# Patient Record
Sex: MALE | Race: BLACK OR AFRICAN AMERICAN | NOT HISPANIC OR LATINO | URBAN - METROPOLITAN AREA
[De-identification: names, ages, dates, MRNs, and addresses within clinical notes are randomized per-mention and may not be internally consistent; named-entity substitution may affect disease eponyms.]

---

## 2023-11-07 ENCOUNTER — EMERGENCY (EMERGENCY)
Facility: HOSPITAL | Age: 53
LOS: 0 days | Discharge: ANOTHER HOSPITAL | End: 2023-11-07
Attending: STUDENT IN AN ORGANIZED HEALTH CARE EDUCATION/TRAINING PROGRAM
Payer: COMMERCIAL

## 2023-11-07 ENCOUNTER — INPATIENT (INPATIENT)
Facility: HOSPITAL | Age: 53
LOS: 3 days | Discharge: HOME | DRG: 176 | End: 2023-11-11
Attending: STUDENT IN AN ORGANIZED HEALTH CARE EDUCATION/TRAINING PROGRAM | Admitting: STUDENT IN AN ORGANIZED HEALTH CARE EDUCATION/TRAINING PROGRAM
Payer: COMMERCIAL

## 2023-11-07 VITALS
WEIGHT: 205.03 LBS | SYSTOLIC BLOOD PRESSURE: 132 MMHG | TEMPERATURE: 98 F | HEIGHT: 72 IN | DIASTOLIC BLOOD PRESSURE: 89 MMHG | HEART RATE: 137 BPM | RESPIRATION RATE: 18 BRPM | OXYGEN SATURATION: 94 %

## 2023-11-07 VITALS
TEMPERATURE: 99 F | HEART RATE: 132 BPM | SYSTOLIC BLOOD PRESSURE: 156 MMHG | RESPIRATION RATE: 20 BRPM | OXYGEN SATURATION: 95 % | DIASTOLIC BLOOD PRESSURE: 96 MMHG | HEIGHT: 72 IN | WEIGHT: 205.03 LBS

## 2023-11-07 VITALS — HEART RATE: 129 BPM | SYSTOLIC BLOOD PRESSURE: 170 MMHG | DIASTOLIC BLOOD PRESSURE: 121 MMHG

## 2023-11-07 DIAGNOSIS — S00.511A ABRASION OF LIP, INITIAL ENCOUNTER: ICD-10-CM
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
## 2023-11-07 DIAGNOSIS — I26.99 OTHER PULMONARY EMBOLISM WITHOUT ACUTE COR PULMONALE: ICD-10-CM

## 2023-11-07 DIAGNOSIS — R00.0 TACHYCARDIA, UNSPECIFIED: ICD-10-CM

## 2023-11-07 DIAGNOSIS — I10 ESSENTIAL (PRIMARY) HYPERTENSION: ICD-10-CM

## 2023-11-07 DIAGNOSIS — E78.5 HYPERLIPIDEMIA, UNSPECIFIED: ICD-10-CM

## 2023-11-07 DIAGNOSIS — R55 SYNCOPE AND COLLAPSE: ICD-10-CM

## 2023-11-07 DIAGNOSIS — I26.09 OTHER PULMONARY EMBOLISM WITH ACUTE COR PULMONALE: ICD-10-CM
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
## 2023-11-07 LAB
ALBUMIN SERPL ELPH-MCNC: 3.6 G/DL — SIGNIFICANT CHANGE UP (ref 3.3–5)
ALBUMIN SERPL ELPH-MCNC: 3.6 G/DL — SIGNIFICANT CHANGE UP (ref 3.3–5)
ALBUMIN SERPL ELPH-MCNC: 4.3 G/DL — SIGNIFICANT CHANGE UP (ref 3.3–5)
ALBUMIN SERPL ELPH-MCNC: 4.3 G/DL — SIGNIFICANT CHANGE UP (ref 3.3–5)
ALP SERPL-CCNC: 130 U/L — HIGH (ref 40–120)
ALP SERPL-CCNC: 130 U/L — HIGH (ref 40–120)
ALP SERPL-CCNC: 137 U/L — HIGH (ref 40–120)
ALP SERPL-CCNC: 137 U/L — HIGH (ref 40–120)
ALT FLD-CCNC: 115 U/L — HIGH (ref 10–45)
ALT FLD-CCNC: 115 U/L — HIGH (ref 10–45)
ALT FLD-CCNC: 142 U/L — HIGH (ref 12–78)
ALT FLD-CCNC: 142 U/L — HIGH (ref 12–78)
ANION GAP SERPL CALC-SCNC: 13 MMOL/L — SIGNIFICANT CHANGE UP (ref 5–17)
ANION GAP SERPL CALC-SCNC: 13 MMOL/L — SIGNIFICANT CHANGE UP (ref 5–17)
ANION GAP SERPL CALC-SCNC: 9 MMOL/L — SIGNIFICANT CHANGE UP (ref 5–17)
ANION GAP SERPL CALC-SCNC: 9 MMOL/L — SIGNIFICANT CHANGE UP (ref 5–17)
APTT BLD: 32.1 SEC — SIGNIFICANT CHANGE UP (ref 24.5–35.6)
APTT BLD: 32.1 SEC — SIGNIFICANT CHANGE UP (ref 24.5–35.6)
AST SERPL-CCNC: 157 U/L — HIGH (ref 10–40)
AST SERPL-CCNC: 157 U/L — HIGH (ref 10–40)
AST SERPL-CCNC: 203 U/L — HIGH (ref 15–37)
AST SERPL-CCNC: 203 U/L — HIGH (ref 15–37)
BASOPHILS # BLD AUTO: 0.02 K/UL — SIGNIFICANT CHANGE UP (ref 0–0.2)
BASOPHILS # BLD AUTO: 0.02 K/UL — SIGNIFICANT CHANGE UP (ref 0–0.2)
BASOPHILS # BLD AUTO: 0.06 K/UL — SIGNIFICANT CHANGE UP (ref 0–0.2)
BASOPHILS # BLD AUTO: 0.06 K/UL — SIGNIFICANT CHANGE UP (ref 0–0.2)
BASOPHILS NFR BLD AUTO: 0.3 % — SIGNIFICANT CHANGE UP (ref 0–2)
BASOPHILS NFR BLD AUTO: 0.3 % — SIGNIFICANT CHANGE UP (ref 0–2)
BASOPHILS NFR BLD AUTO: 0.9 % — SIGNIFICANT CHANGE UP (ref 0–2)
BASOPHILS NFR BLD AUTO: 0.9 % — SIGNIFICANT CHANGE UP (ref 0–2)
BILIRUB SERPL-MCNC: 0.4 MG/DL — SIGNIFICANT CHANGE UP (ref 0.2–1.2)
BUN SERPL-MCNC: 14 MG/DL — SIGNIFICANT CHANGE UP (ref 7–23)
BUN SERPL-MCNC: 14 MG/DL — SIGNIFICANT CHANGE UP (ref 7–23)
BUN SERPL-MCNC: 15 MG/DL — SIGNIFICANT CHANGE UP (ref 7–23)
BUN SERPL-MCNC: 15 MG/DL — SIGNIFICANT CHANGE UP (ref 7–23)
CALCIUM SERPL-MCNC: 9.1 MG/DL — SIGNIFICANT CHANGE UP (ref 8.5–10.1)
CALCIUM SERPL-MCNC: 9.1 MG/DL — SIGNIFICANT CHANGE UP (ref 8.5–10.1)
CALCIUM SERPL-MCNC: 9.7 MG/DL — SIGNIFICANT CHANGE UP (ref 8.4–10.5)
CALCIUM SERPL-MCNC: 9.7 MG/DL — SIGNIFICANT CHANGE UP (ref 8.4–10.5)
CHLORIDE SERPL-SCNC: 104 MMOL/L — SIGNIFICANT CHANGE UP (ref 96–108)
CO2 SERPL-SCNC: 22 MMOL/L — SIGNIFICANT CHANGE UP (ref 22–31)
CO2 SERPL-SCNC: 22 MMOL/L — SIGNIFICANT CHANGE UP (ref 22–31)
CO2 SERPL-SCNC: 25 MMOL/L — SIGNIFICANT CHANGE UP (ref 22–31)
CO2 SERPL-SCNC: 25 MMOL/L — SIGNIFICANT CHANGE UP (ref 22–31)
CREAT SERPL-MCNC: 1.12 MG/DL — SIGNIFICANT CHANGE UP (ref 0.5–1.3)
CREAT SERPL-MCNC: 1.12 MG/DL — SIGNIFICANT CHANGE UP (ref 0.5–1.3)
CREAT SERPL-MCNC: 1.38 MG/DL — HIGH (ref 0.5–1.3)
CREAT SERPL-MCNC: 1.38 MG/DL — HIGH (ref 0.5–1.3)
D DIMER BLD IA.RAPID-MCNC: 6121 NG/ML DDU — HIGH
D DIMER BLD IA.RAPID-MCNC: 6121 NG/ML DDU — HIGH
EGFR: 61 ML/MIN/1.73M2 — SIGNIFICANT CHANGE UP
EGFR: 61 ML/MIN/1.73M2 — SIGNIFICANT CHANGE UP
EGFR: 78 ML/MIN/1.73M2 — SIGNIFICANT CHANGE UP
EGFR: 78 ML/MIN/1.73M2 — SIGNIFICANT CHANGE UP
EOSINOPHIL # BLD AUTO: 0 K/UL — SIGNIFICANT CHANGE UP (ref 0–0.5)
EOSINOPHIL # BLD AUTO: 0 K/UL — SIGNIFICANT CHANGE UP (ref 0–0.5)
EOSINOPHIL # BLD AUTO: 0.08 K/UL — SIGNIFICANT CHANGE UP (ref 0–0.5)
EOSINOPHIL # BLD AUTO: 0.08 K/UL — SIGNIFICANT CHANGE UP (ref 0–0.5)
EOSINOPHIL NFR BLD AUTO: 0 % — SIGNIFICANT CHANGE UP (ref 0–6)
EOSINOPHIL NFR BLD AUTO: 0 % — SIGNIFICANT CHANGE UP (ref 0–6)
EOSINOPHIL NFR BLD AUTO: 1.3 % — SIGNIFICANT CHANGE UP (ref 0–6)
EOSINOPHIL NFR BLD AUTO: 1.3 % — SIGNIFICANT CHANGE UP (ref 0–6)
GLUCOSE SERPL-MCNC: 150 MG/DL — HIGH (ref 70–99)
GLUCOSE SERPL-MCNC: 150 MG/DL — HIGH (ref 70–99)
GLUCOSE SERPL-MCNC: 195 MG/DL — HIGH (ref 70–99)
GLUCOSE SERPL-MCNC: 195 MG/DL — HIGH (ref 70–99)
HCT VFR BLD CALC: 37.5 % — LOW (ref 39–50)
HCT VFR BLD CALC: 37.5 % — LOW (ref 39–50)
HCT VFR BLD CALC: 38.7 % — LOW (ref 39–50)
HCT VFR BLD CALC: 38.7 % — LOW (ref 39–50)
HGB BLD-MCNC: 12.2 G/DL — LOW (ref 13–17)
HGB BLD-MCNC: 12.2 G/DL — LOW (ref 13–17)
HGB BLD-MCNC: 12.4 G/DL — LOW (ref 13–17)
HGB BLD-MCNC: 12.4 G/DL — LOW (ref 13–17)
IMM GRANULOCYTES NFR BLD AUTO: 0.3 % — SIGNIFICANT CHANGE UP (ref 0–0.9)
IMM GRANULOCYTES NFR BLD AUTO: 0.3 % — SIGNIFICANT CHANGE UP (ref 0–0.9)
INR BLD: 0.98 RATIO — SIGNIFICANT CHANGE UP (ref 0.85–1.18)
INR BLD: 0.98 RATIO — SIGNIFICANT CHANGE UP (ref 0.85–1.18)
LYMPHOCYTES # BLD AUTO: 0.6 K/UL — LOW (ref 1–3.3)
LYMPHOCYTES # BLD AUTO: 0.6 K/UL — LOW (ref 1–3.3)
LYMPHOCYTES # BLD AUTO: 1.15 K/UL — SIGNIFICANT CHANGE UP (ref 1–3.3)
LYMPHOCYTES # BLD AUTO: 1.15 K/UL — SIGNIFICANT CHANGE UP (ref 1–3.3)
LYMPHOCYTES # BLD AUTO: 18.3 % — SIGNIFICANT CHANGE UP (ref 13–44)
LYMPHOCYTES # BLD AUTO: 18.3 % — SIGNIFICANT CHANGE UP (ref 13–44)
LYMPHOCYTES # BLD AUTO: 9.6 % — LOW (ref 13–44)
LYMPHOCYTES # BLD AUTO: 9.6 % — LOW (ref 13–44)
MAGNESIUM SERPL-MCNC: 1.9 MG/DL — SIGNIFICANT CHANGE UP (ref 1.6–2.6)
MAGNESIUM SERPL-MCNC: 1.9 MG/DL — SIGNIFICANT CHANGE UP (ref 1.6–2.6)
MANUAL SMEAR VERIFICATION: SIGNIFICANT CHANGE UP
MANUAL SMEAR VERIFICATION: SIGNIFICANT CHANGE UP
MCHC RBC-ENTMCNC: 28.2 PG — SIGNIFICANT CHANGE UP (ref 27–34)
MCHC RBC-ENTMCNC: 28.2 PG — SIGNIFICANT CHANGE UP (ref 27–34)
MCHC RBC-ENTMCNC: 28.7 PG — SIGNIFICANT CHANGE UP (ref 27–34)
MCHC RBC-ENTMCNC: 28.7 PG — SIGNIFICANT CHANGE UP (ref 27–34)
MCHC RBC-ENTMCNC: 32 G/DL — SIGNIFICANT CHANGE UP (ref 32–36)
MCHC RBC-ENTMCNC: 32 G/DL — SIGNIFICANT CHANGE UP (ref 32–36)
MCHC RBC-ENTMCNC: 32.5 GM/DL — SIGNIFICANT CHANGE UP (ref 32–36)
MCHC RBC-ENTMCNC: 32.5 GM/DL — SIGNIFICANT CHANGE UP (ref 32–36)
MCV RBC AUTO: 88.2 FL — SIGNIFICANT CHANGE UP (ref 80–100)
MONOCYTES # BLD AUTO: 0.62 K/UL — SIGNIFICANT CHANGE UP (ref 0–0.9)
MONOCYTES # BLD AUTO: 0.62 K/UL — SIGNIFICANT CHANGE UP (ref 0–0.9)
MONOCYTES # BLD AUTO: 0.71 K/UL — SIGNIFICANT CHANGE UP (ref 0–0.9)
MONOCYTES # BLD AUTO: 0.71 K/UL — SIGNIFICANT CHANGE UP (ref 0–0.9)
MONOCYTES NFR BLD AUTO: 11.3 % — SIGNIFICANT CHANGE UP (ref 2–14)
MONOCYTES NFR BLD AUTO: 11.3 % — SIGNIFICANT CHANGE UP (ref 2–14)
MONOCYTES NFR BLD AUTO: 9.8 % — SIGNIFICANT CHANGE UP (ref 2–14)
MONOCYTES NFR BLD AUTO: 9.8 % — SIGNIFICANT CHANGE UP (ref 2–14)
NEUTROPHILS # BLD AUTO: 4.41 K/UL — SIGNIFICANT CHANGE UP (ref 1.8–7.4)
NEUTROPHILS # BLD AUTO: 4.41 K/UL — SIGNIFICANT CHANGE UP (ref 1.8–7.4)
NEUTROPHILS # BLD AUTO: 4.9 K/UL — SIGNIFICANT CHANGE UP (ref 1.8–7.4)
NEUTROPHILS # BLD AUTO: 4.9 K/UL — SIGNIFICANT CHANGE UP (ref 1.8–7.4)
NEUTROPHILS NFR BLD AUTO: 70 % — SIGNIFICANT CHANGE UP (ref 43–77)
NEUTROPHILS NFR BLD AUTO: 70 % — SIGNIFICANT CHANGE UP (ref 43–77)
NEUTROPHILS NFR BLD AUTO: 78.2 % — HIGH (ref 43–77)
NEUTROPHILS NFR BLD AUTO: 78.2 % — HIGH (ref 43–77)
NRBC # BLD: 0 /100 WBCS — SIGNIFICANT CHANGE UP (ref 0–0)
NRBC # BLD: 0 /100 WBCS — SIGNIFICANT CHANGE UP (ref 0–0)
NT-PROBNP SERPL-SCNC: 2217 PG/ML — HIGH (ref 0–125)
NT-PROBNP SERPL-SCNC: 2217 PG/ML — HIGH (ref 0–125)
NT-PROBNP SERPL-SCNC: 2348 PG/ML — HIGH (ref 0–300)
NT-PROBNP SERPL-SCNC: 2348 PG/ML — HIGH (ref 0–300)
OVALOCYTES BLD QL SMEAR: SLIGHT — SIGNIFICANT CHANGE UP
OVALOCYTES BLD QL SMEAR: SLIGHT — SIGNIFICANT CHANGE UP
PLAT MORPH BLD: NORMAL — SIGNIFICANT CHANGE UP
PLAT MORPH BLD: NORMAL — SIGNIFICANT CHANGE UP
PLATELET # BLD AUTO: 93 K/UL — LOW (ref 150–400)
PLATELET # BLD AUTO: 93 K/UL — LOW (ref 150–400)
PLATELET # BLD AUTO: 97 K/UL — LOW (ref 150–400)
PLATELET # BLD AUTO: 97 K/UL — LOW (ref 150–400)
POIKILOCYTOSIS BLD QL AUTO: SLIGHT — SIGNIFICANT CHANGE UP
POIKILOCYTOSIS BLD QL AUTO: SLIGHT — SIGNIFICANT CHANGE UP
POTASSIUM SERPL-MCNC: 3.2 MMOL/L — LOW (ref 3.5–5.3)
POTASSIUM SERPL-MCNC: 3.2 MMOL/L — LOW (ref 3.5–5.3)
POTASSIUM SERPL-MCNC: 3.4 MMOL/L — LOW (ref 3.5–5.3)
POTASSIUM SERPL-MCNC: 3.4 MMOL/L — LOW (ref 3.5–5.3)
POTASSIUM SERPL-SCNC: 3.2 MMOL/L — LOW (ref 3.5–5.3)
POTASSIUM SERPL-SCNC: 3.2 MMOL/L — LOW (ref 3.5–5.3)
POTASSIUM SERPL-SCNC: 3.4 MMOL/L — LOW (ref 3.5–5.3)
POTASSIUM SERPL-SCNC: 3.4 MMOL/L — LOW (ref 3.5–5.3)
PROT SERPL-MCNC: 7.5 G/DL — SIGNIFICANT CHANGE UP (ref 6–8.3)
PROT SERPL-MCNC: 7.5 G/DL — SIGNIFICANT CHANGE UP (ref 6–8.3)
PROT SERPL-MCNC: 7.8 GM/DL — SIGNIFICANT CHANGE UP (ref 6–8.3)
PROT SERPL-MCNC: 7.8 GM/DL — SIGNIFICANT CHANGE UP (ref 6–8.3)
PROTHROM AB SERPL-ACNC: 11.8 SEC — SIGNIFICANT CHANGE UP (ref 9.5–13)
PROTHROM AB SERPL-ACNC: 11.8 SEC — SIGNIFICANT CHANGE UP (ref 9.5–13)
RBC # BLD: 4.25 M/UL — SIGNIFICANT CHANGE UP (ref 4.2–5.8)
RBC # BLD: 4.25 M/UL — SIGNIFICANT CHANGE UP (ref 4.2–5.8)
RBC # BLD: 4.39 M/UL — SIGNIFICANT CHANGE UP (ref 4.2–5.8)
RBC # BLD: 4.39 M/UL — SIGNIFICANT CHANGE UP (ref 4.2–5.8)
RBC # FLD: 13.2 % — SIGNIFICANT CHANGE UP (ref 10.3–14.5)
RBC # FLD: 13.2 % — SIGNIFICANT CHANGE UP (ref 10.3–14.5)
RBC # FLD: 13.3 % — SIGNIFICANT CHANGE UP (ref 10.3–14.5)
RBC # FLD: 13.3 % — SIGNIFICANT CHANGE UP (ref 10.3–14.5)
RBC BLD AUTO: ABNORMAL
RBC BLD AUTO: ABNORMAL
SODIUM SERPL-SCNC: 138 MMOL/L — SIGNIFICANT CHANGE UP (ref 135–145)
SODIUM SERPL-SCNC: 138 MMOL/L — SIGNIFICANT CHANGE UP (ref 135–145)
SODIUM SERPL-SCNC: 139 MMOL/L — SIGNIFICANT CHANGE UP (ref 135–145)
SODIUM SERPL-SCNC: 139 MMOL/L — SIGNIFICANT CHANGE UP (ref 135–145)
TROPONIN I, HIGH SENSITIVITY RESULT: 946 NG/L — HIGH
TROPONIN I, HIGH SENSITIVITY RESULT: 946 NG/L — HIGH
TROPONIN T, HIGH SENSITIVITY RESULT: 180 NG/L — HIGH (ref 0–51)
TROPONIN T, HIGH SENSITIVITY RESULT: 180 NG/L — HIGH (ref 0–51)
WBC # BLD: 6.27 K/UL — SIGNIFICANT CHANGE UP (ref 3.8–10.5)
WBC # BLD: 6.27 K/UL — SIGNIFICANT CHANGE UP (ref 3.8–10.5)
WBC # BLD: 6.3 K/UL — SIGNIFICANT CHANGE UP (ref 3.8–10.5)
WBC # BLD: 6.3 K/UL — SIGNIFICANT CHANGE UP (ref 3.8–10.5)
WBC # FLD AUTO: 6.27 K/UL — SIGNIFICANT CHANGE UP (ref 3.8–10.5)
WBC # FLD AUTO: 6.27 K/UL — SIGNIFICANT CHANGE UP (ref 3.8–10.5)
WBC # FLD AUTO: 6.3 K/UL — SIGNIFICANT CHANGE UP (ref 3.8–10.5)
WBC # FLD AUTO: 6.3 K/UL — SIGNIFICANT CHANGE UP (ref 3.8–10.5)

## 2023-11-07 PROCEDURE — 70450 CT HEAD/BRAIN W/O DYE: CPT | Mod: 26,MA

## 2023-11-07 PROCEDURE — 99291 CRITICAL CARE FIRST HOUR: CPT

## 2023-11-07 PROCEDURE — 93356 MYOCRD STRAIN IMG SPCKL TRCK: CPT

## 2023-11-07 PROCEDURE — 93306 TTE W/DOPPLER COMPLETE: CPT | Mod: 26

## 2023-11-07 PROCEDURE — 71045 X-RAY EXAM CHEST 1 VIEW: CPT | Mod: 26

## 2023-11-07 PROCEDURE — 71275 CT ANGIOGRAPHY CHEST: CPT | Mod: 26,MA

## 2023-11-07 PROCEDURE — 70486 CT MAXILLOFACIAL W/O DYE: CPT | Mod: 26,MA

## 2023-11-07 PROCEDURE — 72125 CT NECK SPINE W/O DYE: CPT | Mod: 26,MA

## 2023-11-07 PROCEDURE — 93010 ELECTROCARDIOGRAM REPORT: CPT

## 2023-11-07 RX ORDER — HEPARIN SODIUM 5000 [USP'U]/ML
3500 INJECTION INTRAVENOUS; SUBCUTANEOUS EVERY 6 HOURS
Refills: 0 | Status: DISCONTINUED | OUTPATIENT
Start: 2023-11-07 | End: 2023-11-07

## 2023-11-07 RX ORDER — HEPARIN SODIUM 5000 [USP'U]/ML
INJECTION INTRAVENOUS; SUBCUTANEOUS
Qty: 25000 | Refills: 0 | Status: DISCONTINUED | OUTPATIENT
Start: 2023-11-07 | End: 2023-11-07

## 2023-11-07 RX ORDER — SODIUM CHLORIDE 9 MG/ML
500 INJECTION INTRAMUSCULAR; INTRAVENOUS; SUBCUTANEOUS ONCE
Refills: 0 | Status: COMPLETED | OUTPATIENT
Start: 2023-11-07 | End: 2023-11-07

## 2023-11-07 RX ORDER — RAMIPRIL 5 MG
1 CAPSULE ORAL
Refills: 0 | DISCHARGE

## 2023-11-07 RX ORDER — HEPARIN SODIUM 5000 [USP'U]/ML
INJECTION INTRAVENOUS; SUBCUTANEOUS
Qty: 25000 | Refills: 0 | Status: DISCONTINUED | OUTPATIENT
Start: 2023-11-07 | End: 2023-11-09

## 2023-11-07 RX ORDER — ATORVASTATIN CALCIUM 80 MG/1
1 TABLET, FILM COATED ORAL
Refills: 0 | DISCHARGE

## 2023-11-07 RX ORDER — HEPARIN SODIUM 5000 [USP'U]/ML
7500 INJECTION INTRAVENOUS; SUBCUTANEOUS EVERY 6 HOURS
Refills: 0 | Status: DISCONTINUED | OUTPATIENT
Start: 2023-11-07 | End: 2023-11-07

## 2023-11-07 RX ORDER — SODIUM CHLORIDE 9 MG/ML
1000 INJECTION INTRAMUSCULAR; INTRAVENOUS; SUBCUTANEOUS ONCE
Refills: 0 | Status: COMPLETED | OUTPATIENT
Start: 2023-11-07 | End: 2023-11-07

## 2023-11-07 RX ORDER — INDAPAMIDE 1.25 MG
1 TABLET ORAL
Refills: 0 | DISCHARGE

## 2023-11-07 RX ORDER — HEPARIN SODIUM 5000 [USP'U]/ML
7500 INJECTION INTRAVENOUS; SUBCUTANEOUS ONCE
Refills: 0 | Status: COMPLETED | OUTPATIENT
Start: 2023-11-07 | End: 2023-11-07

## 2023-11-07 RX ADMIN — HEPARIN SODIUM 1700 UNIT(S)/HR: 5000 INJECTION INTRAVENOUS; SUBCUTANEOUS at 18:29

## 2023-11-07 RX ADMIN — SODIUM CHLORIDE 500 MILLILITER(S): 9 INJECTION INTRAMUSCULAR; INTRAVENOUS; SUBCUTANEOUS at 21:55

## 2023-11-07 RX ADMIN — SODIUM CHLORIDE 1000 MILLILITER(S): 9 INJECTION INTRAMUSCULAR; INTRAVENOUS; SUBCUTANEOUS at 17:07

## 2023-11-07 RX ADMIN — HEPARIN SODIUM 1700 UNIT(S)/HR: 5000 INJECTION INTRAVENOUS; SUBCUTANEOUS at 21:56

## 2023-11-07 RX ADMIN — HEPARIN SODIUM 7500 UNIT(S): 5000 INJECTION INTRAVENOUS; SUBCUTANEOUS at 18:15

## 2023-11-07 NOTE — H&P ADULT - PROBLEM SELECTOR PLAN 1
- ECHO, Doppler, CTA, CXR  - CBC, BMP, VBG  - Heparin  - No oxygen requirements at this time - ECHO, Doppler, CTA, CXR  - CBC, BMP, VBG, Coags  - Heparin  - No oxygen requirements at this time  - continuous pulse ox  - Heme/Onc consult for w/u of possible thrombophilia disorder - ECHO, LE Doppler, CTA, CXR  - CBC, BMP, VBG, Coags  - Heparin  - No oxygen requirements at this time; continuous pulse ox  - Cards consulted  - Heme/Onc consult warranted in AM for w/u of possible thrombophilia disorder - ECHO, LE Doppler, CTA, CXR  - CBC, BMP, Coags  - Heparin  - No oxygen requirements at this time; continuous pulse ox  - Cards consulted  - Heme/Onc consult warranted in AM for w/u of possible thrombophilia disorder - ECHO, LE Doppler, CTA, CXR  - CBC, BMP, Coags, Trops, BNP  - Heparin  - No oxygen requirements at this time; continuous pulse ox  - Cards consulted (no surgical intervention at this time)  - Heme/Onc consult warranted in AM for w/u of possible thrombophilia disorder - ECHO, LE Doppler, CTA, CXR  - CBC, BMP, Coags, Trops, BNP  - Heparin  - No oxygen requirements at this time; continuous pulse ox  - Cards consulted (no surgical intervention at this time) - Pt found to have CTA with large bilat PE in pulm art extending into lobar branches; he is not hypoxic or hypotensive on adm.   - ECHO shows R heart strain and severe pulm HTN, LE Doppler ordered, CXR clear lungs  - CBC, BMP, Coags, elevated Trops and BNP as well as transaminitis  - Heparin  - No oxygen requirements at this time; continuous pulse ox  - Cards consulted (no surgical intervention at this time)  - Monitor on tele - Pt found to have CTA with large bilat PE in pulm art extending into lobar branches; he is not hypoxic or hypotensive on adm.   - ECHO shows R heart strain and severe pulm HTN, LE Doppler ordered, CXR clear lungs  - CBC, BMP, Coags obtained, elevated Trops (trend for peak) and BNP as well as transaminitis seen  - Heparin drip started  - No oxygen requirements at this time; continuous pulse ox  - Cards consulted (no surgical intervention at this time)  - Monitor on tele

## 2023-11-07 NOTE — ED PROVIDER NOTE - OBJECTIVE STATEMENT
53-year-old male with past medical history hypertension hyperlipidemia presenting to the ED status post syncopal episode, patient walking in park, and had syncopal insult fell face forward and felt lightheaded while walking, tooth #9 displacement and abrasion to noted to upper and lower lip.  Patient with recent Tdap 6 weeks ago, reports no chest pain or shortness of breath denies any abdominal pain denies any associate nausea vomiting fever chills.  Reports no current complaints at this time.

## 2023-11-07 NOTE — ED PROVIDER NOTE - CRITICAL CARE ATTENDING CONTRIBUTION TO CARE
Upon my evaluation, this patient had a high probability of imminent or life-threatening deterioration due to PE with R heart strain, which required my direct attention, intervention, and personal management.  The patient has a  medical condition that impairs one or more vital organ systems.  Frequent personal assessment and adjustment of medical interventions was performed.      I have personally provided 60 minutes of critical care time exclusive of time spent on separately billable procedures. Time includes review of laboratory data, radiology results, discussion with consultants, patient and family; monitoring for potential decompensation, as well as time spent retrieving data and reviewing the chart and documenting the visit. Interventions were performed as documented above.

## 2023-11-07 NOTE — H&P ADULT - NSHPSOCIALHISTORY_GEN_ALL_CORE
Lives with aunt at home. Has daughter. Works two jobs. Lives with aunt at home. Has daughter. Employed in produce and insurance (2 jobs). [place]

## 2023-11-07 NOTE — ED ADULT TRIAGE NOTE - WEIGHT IN KG
What Type Of Note Output Would You Prefer (Optional)?: Bullet Format How Severe Are Your Spot(S)?: moderate Have Your Spot(S) Been Treated In The Past?: has not been treated Hpi Title: Evaluation of Skin Lesions 93

## 2023-11-07 NOTE — H&P ADULT - PROBLEM SELECTOR PLAN 3
- Pt placed back on home Ramipril 5mg  - Holding Lasix - Pt placed back on home Ramipril 5mg  - Holding thiazide - Pt placed back on home Ramipril 5mg  - Holding thiazide (Inapamide) - Holding Ramipril and thiazide (Inapamide) due to right heart strain

## 2023-11-07 NOTE — ED ADULT NURSE REASSESSMENT NOTE - NS ED NURSE REASSESS COMMENT FT1
Pt transferred on Heparin running at 17u/hr started at 1808. Two RNs at bedside with attending Danyel Kraft for verification. Current infusion to continue at this time until 6hr PTT (0000am). New Heparin infusion will be ordered once resulted.

## 2023-11-07 NOTE — ED PROVIDER NOTE - PROGRESS NOTE DETAILS
Coleman: Signed out to me pending CT report - patient with b/l main lobar PE with right heart strain.  Cardiac markers elevated, platelets 97, discussed with PERT and recommending heparin as benefits outweigh risks at this time and transfer for possible intervention.  Patient has no other signs of obvious external trauma.  CT head without bleed.  Currently remains comfortable on RA 95% in no respiratory distress and denies any pain.  Will transfer to [facility] ED.  Patient and family at bedside updated on plan and results and consented for transfer.

## 2023-11-07 NOTE — H&P ADULT - NSHPREVIEWOFSYSTEMS_GEN_ALL_CORE

## 2023-11-07 NOTE — CONSULT NOTE ADULT - SUBJECTIVE AND OBJECTIVE BOX
Patient seen and evaluated at bedside    Chief Complaint: Syncope    HPI:  52yo M w/HTN, HLD who presents as a transfer from [place] for a PE. Patient was walking to his car from work today when he felt dizzy, he bent down and had a syncopal episode. Regained consciousness quickly, called his aunt who told him to go to the ED.    Presented to [place], BP 160s/100s, HR 130s, SpO2 97% on RA.  CTPE notable for bilateral PE with R heart strain.    PMHx:   HTN (hypertension)    HLD (hyperlipidemia)        PSHx:   No significant past surgical history        Allergies:  No Known Allergies      Home Meds:  Home Medications:      Current Medications:   heparin  Infusion.  Unit(s)/Hr IV Continuous <Continuous>      FAMILY HISTORY:      Social History:  Smoking History:  Alcohol Use:  Drug Use:    REVIEW OF SYSTEMS:  CONSTITUTIONAL: No weakness, fevers or chills  EYES/ENT: No visual changes;  No dysphagia  NECK: No pain or stiffness  RESPIRATORY: No cough, wheezing, hemoptysis; No shortness of breath  CARDIOVASCULAR: No chest pain or palpitations; No lower extremity edema  GASTROINTESTINAL: No abdominal or epigastric pain. No nausea, vomiting, or hematemesis; No diarrhea or constipation. No melena or hematochezia.  BACK: No back pain  GENITOURINARY: No dysuria, frequency or hematuria  NEUROLOGICAL: No numbness or weakness  SKIN: No itching, burning, rashes, or lesions   All other review of systems is negative unless indicated above.    Physical Exam:  T(F): 98.9 (11-07), Max: 99.2 (11-07)  HR: 126 (11-07) (126 - 140)  BP: 134/100 (11-07) (134/100 - 156/96)  RR: 21 (11-07)  SpO2: 96% (11-07)  GENERAL: No acute distress, well-developed  HEAD:  Atraumatic, Normocephalic  ENT: EOMI, PERRLA, conjunctiva and sclera clear, Neck supple, No JVD, moist mucosa  CHEST/LUNG: Clear to auscultation bilaterally; No wheeze, equal breath sounds bilaterally   BACK: No spinal tenderness  HEART: Regular rate and rhythm; No murmurs, rubs, or gallops  ABDOMEN: Soft, Nontender, Nondistended; Bowel sounds present  EXTREMITIES:  No clubbing, cyanosis, or edema  PSYCH: Nl behavior, nl affect  NEUROLOGY: AAOx3, non-focal, cranial nerves intact  SKIN: Normal color, No rashes or lesions  LINES:    Cardiovascular Diagnostic Testing:    ECG: Personally reviewed:    Echo: Personally reviewed:    Stress Testing:    Cath:    Imaging:    CXR: Personally reviewed    Labs: Personally reviewed                        12.2   6.27  )-----------( 93       ( 07 Nov 2023 20:07 )             37.5     11-07    139  |  104  |  14  ----------------------------<  150<H>  3.4<L>   |  22  |  1.12    Ca    9.7      07 Nov 2023 20:07  Mg     1.9     11-07    TPro  7.5  /  Alb  4.3  /  TBili  0.4  /  DBili  x   /  AST  157<H>  /  ALT  115<H>  /  AlkPhos  130<H>  11-07        CARDIAC MARKERS ( 07 Nov 2023 20:07 )  180 ng/L / x     / x     / x     / x     / x                     Patient seen and evaluated at bedside    Chief Complaint: Syncope    HPI:  54yo M w/HTN, HLD who presents as a transfer from [place] for a PE. Patient was walking to his car from work today when he felt dizzy, he bent down and had a syncopal episode. Regained consciousness quickly, called his aunt who told him to go to the ED.    Presented to [place], BP 160s/100s, HR 130s, SpO2 97% on RA.  CTPE notable for bilateral PE with R heart strain. Troponin and BNP elevated as well. Transfer call initiated for consideration of interventional approaches and higher level of care. Transferred to [facility] ED for Cardiology evaluation. Started on herpain gtt prior to transfer.    Upon arrival to [facility], tachycardic to 130 but BP remained elevated. Breathing comfortably on RA, satting 97%. Stat TTE with evidence of RV dysfunction, no clot in transit. Patient without symptoms at all, no longer dizzy or lightheaded. Patient denies personal or family h/o clotting, but has worn compression socks for years because he was told he has "leaky veins." Not up to date on age appropriate cancer screening.    PMHx:   HTN (hypertension)    HLD (hyperlipidemia)        PSHx:   No significant past surgical history        Allergies:  No Known Allergies      Home Meds:  Home Medications:  Ramipril 5mg  Indapamide 1.5mg  Atorvastatin 40mg    Current Medications:   heparin  Infusion.  Unit(s)/Hr IV Continuous <Continuous>      FAMILY HISTORY:  NC    Social History:  Smoking History: no  Alcohol Use: social  Drug Use: no    REVIEW OF SYSTEMS:  All other review of systems is negative unless indicated above.    Physical Exam:  T(F): 98.9 (11-07), Max: 99.2 (11-07)  HR: 126 (11-07) (126 - 140)  BP: 134/100 (11-07) (134/100 - 156/96)  RR: 21 (11-07)  SpO2: 96% (11-07)  GENERAL: No acute distress, well-developed  HEAD:  Atraumatic, Normocephalic  ENT: EOMI, PERRLA, conjunctiva and sclera clear, Neck supple, No JVD, moist mucosa  CHEST/LUNG: Clear to auscultation bilaterally; No wheeze, equal breath sounds bilaterally   BACK: No spinal tenderness  HEART: Tachycardic, regular; No murmurs, rubs, or gallops  ABDOMEN: Soft, Nontender, Nondistended; Bowel sounds present  EXTREMITIES:  No clubbing, cyanosis, or edema  PSYCH: Nl behavior, nl affect  NEUROLOGY: AAOx3, non-focal, cranial nerves intact  SKIN: Normal color, No rashes or lesions  LINES:    Cardiovascular Diagnostic Testing:    ECG: Personally reviewed: sinus tach, biphasic T waves V2-V3     Echo: Personally reviewed:  _______________________________________________________________________________________     CONCLUSIONS:      1. Technically difficult image quality.   2. Left ventricular endocardium is not well visualized; however, the left ventricular systolic function appears grossly normal.   3. Left ventricular cavity is normal. Left ventricular wall thickness is normal. The interventricular septum is flattened in systole consistent with right ventricular pressure overload.   4. Enlarged right ventricular cavity size and reducedsystolic function.   5. Structurally normal mitral valve with normal leaflet excursion. There is trace mitral regurgitation.   6. Estimated pulmonary artery systolic pressure is 66 mmHg, consistent with severe pulmonary hypertension.   7. No prior echocardiogram is available for comparison.    ________________________________________________________________________________________  FINDINGS:    Stress Testing:    Cath:    Imaging:    CXR: Personally reviewed    Labs: Personally reviewed                        12.2   6.27  )-----------( 93       ( 07 Nov 2023 20:07 )             37.5     11-07    139  |  104  |  14  ----------------------------<  150<H>  3.4<L>   |  22  |  1.12    Ca    9.7      07 Nov 2023 20:07  Mg     1.9     11-07    TPro  7.5  /  Alb  4.3  /  TBili  0.4  /  DBili  x   /  AST  157<H>  /  ALT  115<H>  /  AlkPhos  130<H>  11-07        CARDIAC MARKERS ( 07 Nov 2023 20:07 )  180 ng/L / x     / x     / x     / x     / x                     Patient seen and evaluated at bedside    Chief Complaint: Syncope    HPI:  52yo M w/HTN, HLD who presents as a transfer from [place] for a PE. Patient was walking to his car from work today when he felt dizzy, he bent down and had a syncopal episode. Regained consciousness quickly, called his aunt who told him to go to the ED.    Presented to [place], BP 160s/100s, HR 130s, SpO2 97% on RA.  CTPE notable for bilateral PE with R heart strain. Troponin and BNP elevated as well. ECG sinus tach, c/w Brugada pattern Type 1. Transfer call initiated for consideration of interventional approaches and higher level of care. Transferred to [facility] ED for Cardiology evaluation. Started on herpain gtt prior to transfer.    Upon arrival to [facility], tachycardic to 130 but BP remained elevated. Breathing comfortably on RA, satting 97%. Stat TTE with evidence of RV dysfunction, no clot in transit. Patient without symptoms at all, no longer dizzy or lightheaded. Patient denies personal or family h/o SCD or clotting, but has worn compression socks for years because he was told he has "leaky veins." Not up to date on age appropriate cancer screening.    PMHx:   HTN (hypertension)    HLD (hyperlipidemia)        PSHx:   No significant past surgical history        Allergies:  No Known Allergies      Home Meds:  Home Medications:  Ramipril 5mg  Indapamide 1.5mg  Atorvastatin 40mg    Current Medications:   heparin  Infusion.  Unit(s)/Hr IV Continuous <Continuous>      FAMILY HISTORY:  NC    Social History:  Smoking History: no  Alcohol Use: social  Drug Use: no    REVIEW OF SYSTEMS:  All other review of systems is negative unless indicated above.    Physical Exam:  T(F): 98.9 (11-07), Max: 99.2 (11-07)  HR: 126 (11-07) (126 - 140)  BP: 134/100 (11-07) (134/100 - 156/96)  RR: 21 (11-07)  SpO2: 96% (11-07)  GENERAL: No acute distress, well-developed  HEAD:  Atraumatic, Normocephalic  ENT: EOMI, PERRLA, conjunctiva and sclera clear, Neck supple, No JVD, moist mucosa  CHEST/LUNG: Clear to auscultation bilaterally; No wheeze, equal breath sounds bilaterally   BACK: No spinal tenderness  HEART: Tachycardic, regular; No murmurs, rubs, or gallops  ABDOMEN: Soft, Nontender, Nondistended; Bowel sounds present  EXTREMITIES:  No clubbing, cyanosis, or edema  PSYCH: Nl behavior, nl affect  NEUROLOGY: AAOx3, non-focal, cranial nerves intact  SKIN: Normal color, No rashes or lesions  LINES:    Cardiovascular Diagnostic Testing:    ECG: Personally reviewed: sinus tach, Brugada Type 1 pattern     Echo: Personally reviewed:  _______________________________________________________________________________________     CONCLUSIONS:      1. Technically difficult image quality.   2. Left ventricular endocardium is not well visualized; however, the left ventricular systolic function appears grossly normal.   3. Left ventricular cavity is normal. Left ventricular wall thickness is normal. The interventricular septum is flattened in systole consistent with right ventricular pressure overload.   4. Enlarged right ventricular cavity size and reducedsystolic function.   5. Structurally normal mitral valve with normal leaflet excursion. There is trace mitral regurgitation.   6. Estimated pulmonary artery systolic pressure is 66 mmHg, consistent with severe pulmonary hypertension.   7. No prior echocardiogram is available for comparison.    ________________________________________________________________________________________  FINDINGS:    Stress Testing:    Cath:    Imaging:    CXR: Personally reviewed    Labs: Personally reviewed                        12.2   6.27  )-----------( 93       ( 07 Nov 2023 20:07 )             37.5     11-07    139  |  104  |  14  ----------------------------<  150<H>  3.4<L>   |  22  |  1.12    Ca    9.7      07 Nov 2023 20:07  Mg     1.9     11-07    TPro  7.5  /  Alb  4.3  /  TBili  0.4  /  DBili  x   /  AST  157<H>  /  ALT  115<H>  /  AlkPhos  130<H>  11-07        CARDIAC MARKERS ( 07 Nov 2023 20:07 )  180 ng/L / x     / x     / x     / x     / x

## 2023-11-07 NOTE — H&P ADULT - HISTORY OF PRESENT ILLNESS
53M PMHx of HTN, HLD, and chronic venous insufficiency, transferred from [facility] after presenting for syncopal episode and found to have bilateral PE. Pt states the day prior he had felt dizzy and lightheaded briefly before continuing on with his day. Today (11/7) he experienced this same sensation while arising from lying down following a nap. Later in the same day while in the parking lot the pt fainted striking the ground face first. The event was unwitnessed and he quickly recovered on his own without any stigmata of a postictal state such as confusion. He subsequently proceeded to drive home and was told by his aunt to go to the ED. At [facility] he was found to have bilateral large PE w/ evidence of R heart strain. No fever, cp, sob, abd pain, n/v. He has no Hx of past syncopal episodes nor any of blood clots or Ca. He does note his aunt told him there is known hx of clotting in his family.     ED: Pt found well appearing, tachycardic but not hypoxic or hypotensive. Started on heparin drip.  53M PMHx of HTN, HLD, and chronic venous insufficiency, transferred from [facility] after presenting for syncopal episode and found to have bilateral PE. Pt states the day prior he had felt dizzy and lightheaded briefly before continuing on with his day. Today (11/7) he experienced this same sensation while arising from lying down following a nap. Later in the same day while in the parking lot the pt fainted striking the ground face first. The event was unwitnessed and he quickly recovered on his own without any stigmata of a postictal state such as confusion. He subsequently proceeded to drive home and was told by his aunt to go to the ED. At [facility] he was found to have bilateral large PE w/ evidence of R heart strain. No fever, cp, sob, abd pain, n/v. He has no Hx of past syncopal episodes nor any of blood clots or Ca. He does note his aunt told him there is known hx of clotting in his family.     ED: Pt found well appearing, tachycardic but not hypoxic or hypotensive. Started on heparin drip.   He was also seen by cardiology and no immediate surgical intervention was deemed necessary. 53M PMHx of HTN, HLD, and chronic venous insufficiency, transferred from [facility] after presenting for syncopal episode and found to have bilateral PE. Pt states the day prior he had felt dizzy and lightheaded briefly before continuing on with his day. Today (11/7) he experienced this same sensation while arising from lying down following a nap. Later in the same day while in the parking lot the pt fainted striking the ground face first. The event was unwitnessed and he quickly recovered on his own without any stigmata of a postictal state such as confusion. He subsequently proceeded to drive home and was told by his aunt to go to the ED. At [facility] he was found to have bilateral large PE in Pulm arteries w/ evidence of R heart strain. They transferred him to our services for possible surgical intervention. No fever, cp, sob, abd pain, n/v. He has no Hx of past syncopal episodes nor any of blood clots or Ca. He does note his aunt told him there is known hx of clotting in his family.     ED: Pt found well appearing, tachycardic but not hypoxic or hypotensive. Started on heparin drip.   He was also seen by cardiology and no immediate surgical intervention was deemed necessary.

## 2023-11-07 NOTE — ED PROVIDER NOTE - OBJECTIVE STATEMENT
53-year-old male with history of hypertension, hld presenting as transfer for PE.  Today patient had episode of syncope in parking lot. Was feeling at baseline. [facility] w/ evidence of R heart strain. No fever, cp, sob, abd pain, n/v. Is on "water pill", unknown which. No hx blood clot, CA.

## 2023-11-07 NOTE — ED PROVIDER NOTE - PHYSICAL EXAMINATION
General appearance: NAD, conversant, afebrile    Eyes: anicteric sclerae, SEMAJ, EOMI   HENT: Atraumatic; oropharynx clear, MMM and no ulcerations, no pharyngeal erythema or exudate   Neck: Trachea midline; Full range of motion, supple   Pulm: CTA bl, normal respiratory effort and no intercostal retractions, normal work of breathing   CV: tachycardic   Abdomen: Soft, non-tender, non-distended; no guarding or rebound   Extremities: No peripheral edema or extremity lymphadenopathy.    Skin: Dry, normal temperature, turgor and texture; no rash, ulcers or subcutaneous nodules   Psych: Appropriate affect, cooperative; alert and oriented to person, place and time

## 2023-11-07 NOTE — ED PROVIDER NOTE - CLINICAL SUMMARY MEDICAL DECISION MAKING FREE TEXT BOX
53-year-old male with history of hypertension, hld presenting as transfer for PE.  Today patient had episode of syncope in parking lot. Was feeling at baseline. At [facility] was found to have bilateral large PE w/ evidence of R heart strain. No fever, cp, sob, abd pain, n/v. Is on "water pill", unknown which. No hx blood clot, CA. Exam shows well appearing male, tachycardic. not hypoxic or hypotensive. Will get labs, cardiology c/s. heparin started at [facility].

## 2023-11-07 NOTE — H&P ADULT - ASSESSMENT
53M PMHx HTN, HLD, Chronic Venous Insufficiency, presents for syncopal episode iso bilateral PE.  53M PMHx HTN, HLD, Chronic Venous Insufficiency, presents for syncopal episode iso bilateral PE. Transferred from [facility] after found to have large bilat PE on CTA with right heart strain. Pt not hypoxic or hypotensive on adm. Initial labs significant for transaminitis, elevated tropoin, and ProBNP. CXR shows clear lungs and TTE confirms heart strain.  53M PMHx HTN, HLD, Chronic Venous Insufficiency, presents for syncopal episode iso bilateral PE. Transferred from [facility] after found to have large bilat PE on CTA with right heart strain. Pt not hypoxic or hypotensive on adm. Initial labs significant for transaminitis, elevated tropoin, and ProBNP. CXR shows clear lungs and TTE confirms heart strain and severe pulm HTN.  53M PMHx HTN, HLD, Chronic Venous Insufficiency, presents for syncopal episode iso bilateral PE. Transferred from [facility] after found to have large bilat PE on CTA with right heart strain. Pt not hypoxic or hypotensive on adm. Initial labs significant for transaminitis, elevated tropoin, and ProBNP. CXR shows clear lungs and TTE confirms heart strain and severe pulm HTN. ECG showed sinus tach.  53M PMHx HTN, HLD, Chronic Venous Insufficiency, presents for syncopal episode iso bilateral PE. Transferred from [facility] after found to have large bilat PE on CTA with right heart strain. Pt not hypoxic or hypotensive on adm. Initial labs significant for transaminitis, elevated tropoin, and ProBNP. CXR shows clear lungs and TTE confirms heart strain and severe pulm HTN. ECG showed sinus tach. CTH, cervical spine, and maxillofacial without any ICH or frx, although pt does have dislodged front tooth.  53M PMHx HTN, HLD, Chronic Venous Insufficiency, presents for syncopal episode iso bilateral PE. Transferred from [facility] after found to have large bilat PE in Pulm arteries extending to lobar branches seen on CTA with associated right heart strain. Pt not hypoxic or hypotensive on adm. Initial labs significant for transaminitis, elevated tropoin, and ProBNP. CXR shows clear lungs and TTE confirms heart strain and severe pulm HTN. ECG showed sinus tach. CTH, cervical spine, and maxillofacial without any ICH or frx, although pt does have dislodged L maxillary incisor tooth.  53M PMHx HTN, HLD, Chronic Venous Insufficiency, presents for syncopal episode iso bilateral PE. Transferred from [facility] after found to have large bilat PE in Pulm arteries extending to lobar branches seen on CTA with associated right heart strain.

## 2023-11-07 NOTE — ED PROVIDER NOTE - PHYSICAL EXAMINATION
VITAL SIGNS: I have reviewed nursing notes and confirm.  CONSTITUTIONAL: well-appearing, non-toxic, NAD  SKIN: Warm dry, normal skin turgor lip abrasion   HEAD: NCAT  EYES: EOMI, PERRLA, no scleral icterus  ENT: Moist mucous membranes, normal pharynx with no erythema or exudates tooth 9 displaced   NECK: Supple; non tender. Full ROM. No cervical LAD  CARD: tachycardia no murmurs, rubs or gallops  RESP: clear to ausculation b/l.  No rales, rhonchi, or wheezing.  ABD: soft, + BS, non-tender, non-distended, no rebound or guarding. No CVA tenderness  EXT: Full ROM, no bony tenderness, no pedal edema, no calf tenderness  NEURO: normal motor. normal sensory. CN II-XII intact. Cerebellar testing normal.   PSYCH: Cooperative, appropriate.

## 2023-11-07 NOTE — H&P ADULT - NSICDXFAMILYHX_GEN_ALL_CORE_FT
FAMILY HISTORY:  Aunt  Still living? Unknown  Family history of clotting disorder, Age at diagnosis: Age Unknown

## 2023-11-07 NOTE — ED PROVIDER NOTE - CLINICAL SUMMARY MEDICAL DECISION MAKING FREE TEXT BOX
53-year-old male with past medical history hypertension hyperlipidemia presenting to the ED status post syncopal episode, patient walking in park, and had syncopal insult fell face forward and felt lightheaded while walking, tooth #9 displacement and abrasion to noted to upper and lower lip.  Patient with recent Tdap 6 weeks ago, reports no chest pain or shortness of breath denies any abdominal pain denies any associate nausea vomiting fever chills.  Reports no current complaints at this time.    r/o acs vs PE   EKG, labs, CT angio chest  f/u imaging, labs, reassess

## 2023-11-07 NOTE — H&P ADULT - NSHPPHYSICALEXAM_GEN_ALL_CORE
PHYSICAL EXAM:  GENERAL: NAD, well-appearing  HEAD:  lacerations around the mouth, displaced front tooth  EYES: EOMI, PERRLA, conjunctiva and sclera clear  ENMT: No tonsillar erythema, exudates, or enlargement; Moist mucous membranes  NECK: Supple, No JVD, Normal thyroid  HEART: Regular rate and rhythm; No murmurs, rubs, or gallops  RESPIRATORY: CTA B/L, No W/R/R  ABDOMEN: Soft, Nontender, Nondistended; Bowel sounds present  NEUROLOGY: A&Ox3, nonfocal, moving all extremities  EXTREMITIES:  2+ Peripheral Pulses, No clubbing, cyanosis, or edema  SKIN: warm, dry, normal color, no rash or abnormal lesions PHYSICAL EXAM:  GENERAL: NAD, well-appearing  HEAD:  lacerations around the mouth, displaced front tooth  EYES: EOMI, PERRLA, conjunctiva and sclera clear  ENMT: No tonsillar erythema, exudates, or enlargement; Moist mucous membranes  NECK: Supple, No JVD, Normal thyroid  HEART: tachy; No murmurs, rubs, or gallops  RESPIRATORY: CTA B/L, No W/R/R  ABDOMEN: Soft, Nontender, Nondistended; Bowel sounds present  NEUROLOGY: A&Ox3, nonfocal, moving all extremities  EXTREMITIES:  2+ Peripheral Pulses, No clubbing, cyanosis, or edema  SKIN: warm, dry, normal color, no rash or abnormal lesions PHYSICAL EXAM:  GENERAL: NAD, well-appearing  HEAD:  lacerations around the mouth, displaced front tooth  EYES: EOMI, PERRLA, conjunctiva and sclera clear  ENMT: No tonsillar erythema, exudates, or enlargement; Moist mucous membranes  NECK: Supple, No JVD, Normal thyroid  HEART: tachy; No murmurs, rubs, or gallops  RESPIRATORY: CTA B/L, No W/R/R  ABDOMEN: Soft, Nontender, Nondistended; Bowel sounds present  NEUROLOGY: A&Ox3, nonfocal, moving all extremities  EXTREMITIES:  2+ Peripheral Pulses, b/l LE edema 1+  SKIN: warm, dry, normal color, no rash or abnormal lesions

## 2023-11-07 NOTE — H&P ADULT - PROBLEM SELECTOR PLAN 5
DVT ppx: on Heparin DVT ppx: on Heparin  - DASH/TLC diet  - fall precaution   - dispo pending course

## 2023-11-07 NOTE — ED ADULT NURSE NOTE - BEFAST BALANCE
Excisional Biopsy Additional Text (Leave Blank If You Do Not Want): The margin was drawn around the clinically apparent lesion. An elliptical shape was then drawn on the skin incorporating the lesion and margins.  Incisions were then made along these lines to the appropriate tissue plane and the lesion was extirpated. No

## 2023-11-07 NOTE — ED ADULT TRIAGE NOTE - CHIEF COMPLAINT QUOTE
Patient got off to work, walking in the parking lot and had a syncopal episode and fell face forward. Reports he lost consciousness for few seconds. Noted abrasions in the lips. Not on any blood thinners.

## 2023-11-07 NOTE — H&P ADULT - NSICDXPASTMEDICALHX_GEN_ALL_CORE_FT
PAST MEDICAL HISTORY:  HLD (hyperlipidemia)     HTN (hypertension)      PAST MEDICAL HISTORY:  HLD (hyperlipidemia)     HTN (hypertension)     Lower extremity edema

## 2023-11-07 NOTE — H&P ADULT - PROBLEM SELECTOR PLAN 2
- EKG   - continuous pulse ox - EKG: sinus tach  - continuous pulse ox  - no focal deficits - EKG: sinus tach  - continuous pulse ox  - no focal deficits  - CTH, Cervical spine, and maxillofacial (LIJVS): No ICH, or frx. Does have displaced front tooth. - EKG: sinus tach  - continuous pulse ox  - no focal deficits  - CTH, Cervical spine, and maxillofacial (LIJVS): No ICH, or frx. Does have displaced L maxillary incisor tooth. - EKG: sinus tach  - continuous pulse ox  - no focal deficits  - CTH, Cervical spine, and maxillofacial (LIJVS): No ICH, or frx. Does have displaced L maxillary incisor tooth.  - Monitor on tele

## 2023-11-07 NOTE — ED ADULT NURSE NOTE - NSFALLRISKINTERV_ED_ALL_ED

## 2023-11-07 NOTE — ED ADULT NURSE NOTE - OBJECTIVE STATEMENT
received er bed p8 c/o syncopal episode at his job states he felt lightheaded and was walking out to his car when he went face down + loc x few seconds dislodged front tooth denies any other pain or injury denies dizziness at rest, lightheaded upon standing denies headache or shortness of breath c/o palpitations with hr 130's  a&ox3

## 2023-11-07 NOTE — CONSULT NOTE ADULT - ASSESSMENT
52yo M w/HTN, HLD who presents as a transfer from [place] for syncope, found to have bilateral PE with right heart strain. Intermediate high risk PE, discussed case with CICU attending and PERT interventionalist, no indication for emergent intervention. Patient stable for medicine floor, Vascular cardiology to follow.    Recommendations:  - Cont heparin gtt  - Obtain LE Dopplers  - Consider Heme consult for hypercoag workup in unprovoked PE  - If any hemodynamic or respiratory decompensation, please call back for repeat PERT eval    Case to be staffed with Vascular attending in AM 54yo M w/HTN, HLD who presents as a transfer from [place] for syncope, found to have bilateral PE with right heart strain. Intermediate high risk PE, discussed case with CICU attending and PERT interventionalist, no indication for emergent intervention. Patient stable for medicine floor, Vascular cardiology to follow.    Recommendations:  - Cont heparin gtt  - Trend trops to peak  - Obtain LE Dopplers  - Consider Heme consult for hypercoag workup in unprovoked PE  - If any hemodynamic or respiratory decompensation, please call back for repeat [facility] eval    Case to be staffed with Vascular attending in AM 52yo M w/HTN, HLD who presents as a transfer from [place] for syncope, found to have bilateral PE with right heart strain. Intermediate high risk PE, discussed case with CICU attending and PERT interventionalist, no indication for emergent intervention. Patient stable for medicine floor, Vascular cardiology to follow. ECG with Brugada Type 1 pattern. Syncope more likely related to acute PE with R heart strain, however possibility of arrhythmogenic syncope secondary to Brugada syndrome cannot be ruled out.     Recommendations:  #Intermediate High Risk PE  - Cont heparin gtt  - Trend trops to peak  - Obtain LE Dopplers  - Consider Heme consult for hypercoag workup in unprovoked PE  - If any hemodynamic or respiratory decompensation, please call back for repeat PERT eval    #Brugada Pattern  - EP consult for possible ICD implantation when acute issues from PE are resolved    Case to be staffed with Vascular attending in AM

## 2023-11-07 NOTE — H&P ADULT - NSHPLABSRESULTS_GEN_ALL_CORE
LABORATORY DATA                        12.2   6.27  )-----------( 93       ( 07 Nov 2023 20:07 )             37.5     11-07    139  |  104  |  14  ----------------------------<  150<H>  3.4<L>   |  22  |  1.12    Ca    9.7      07 Nov 2023 20:07  Mg     1.9     11-07    TPro  7.5  /  Alb  4.3  /  TBili  0.4  /  DBili  x   /  AST  157<H>  /  ALT  115<H>  /  AlkPhos  130<H>  11-07        Urinalysis Basic - ( 07 Nov 2023 20:07 )    Color: x / Appearance: x / SG: x / pH: x  Gluc: 150 mg/dL / Ketone: x  / Bili: x / Urobili: x   Blood: x / Protein: x / Nitrite: x   Leuk Esterase: x / RBC: x / WBC x   Sq Epi: x / Non Sq Epi: x / Bacteria: x      CAPILLARY BLOOD GLUCOSE      IMAGING REVIEW  < from: Xray Chest 1 View- PORTABLE-Urgent (11.07.23 @ 20:07) >    IMPRESSION:  Clear lungs.      MICROBIOLOGY REVIEW        CARDIOLOGY REVIEW  < from: TTE Limited W or WO Ultrasound Enhancing Agent (11.07.23 @ 20:09) >    CONCLUSIONS:      1. Technically difficult image quality.   2. Left ventricular endocardium is not well visualized; however, the left ventricular systolic function appears grossly normal.   3. Left ventricular cavity is normal. Left ventricular wall thickness is normal. The interventricular septum is flattened in systole consistent with right ventricular pressure overload.   4. Enlarged right ventricular cavity size and reducedsystolic function.   5. Structurally normal mitral valve with normal leaflet excursion. There is trace mitral regurgitation.   6. Estimated pulmonary artery systolic pressure is 66 mmHg, consistent with severe pulmonary hypertension.   7. No prior echocardiogram is available for comparison.    < end of copied text > LABORATORY DATA                        12.2   6.27  )-----------( 93       ( 07 Nov 2023 20:07 )             37.5     11-07    139  |  104  |  14  ----------------------------<  150<H>  3.4<L>   |  22  |  1.12    Ca    9.7      07 Nov 2023 20:07  Mg     1.9     11-07    TPro  7.5  /  Alb  4.3  /  TBili  0.4  /  DBili  x   /  AST  157<H>  /  ALT  115<H>  /  AlkPhos  130<H>  11-07        Urinalysis Basic - ( 07 Nov 2023 20:07 )    Color: x / Appearance: x / SG: x / pH: x  Gluc: 150 mg/dL / Ketone: x  / Bili: x / Urobili: x   Blood: x / Protein: x / Nitrite: x   Leuk Esterase: x / RBC: x / WBC x   Sq Epi: x / Non Sq Epi: x / Bacteria: x      CAPILLARY BLOOD GLUCOSE      IMAGING REVIEW  < from: Xray Chest 1 View- PORTABLE-Urgent (11.07.23 @ 20:07) >    IMPRESSION:  Clear lungs.      MICROBIOLOGY REVIEW        CARDIOLOGY REVIEW  < from: TTE Limited W or WO Ultrasound Enhancing Agent (11.07.23 @ 20:09) >    CONCLUSIONS:      1. Technically difficult image quality.   2. Left ventricular endocardium is not well visualized; however, the left ventricular systolic function appears grossly normal.   3. Left ventricular cavity is normal. Left ventricular wall thickness is normal. The interventricular septum is flattened in systole consistent with right ventricular pressure overload.   4. Enlarged right ventricular cavity size and reducedsystolic function.   5. Structurally normal mitral valve with normal leaflet excursion. There is trace mitral regurgitation.   6. Estimated pulmonary artery systolic pressure is 66 mmHg, consistent with severe pulmonary hypertension.   7. No prior echocardiogram is available for comparison.    < end of copied text >    ECG: Personally reviewed: sinus tach, biphasic T waves V2-V3 ECG: Personally reviewed: sinus tach, biphasic T waves V2-V3    < from: TTE Limited W or WO Ultrasound Enhancing Agent (11.07.23 @ 20:09) >       CONCLUSIONS:      1. Technically difficult image quality.   2. Left ventricular endocardium is not well visualized; however, the left ventricular systolic function appears grossly normal.   3. Left ventricular cavity is normal. Left ventricular wall thickness is normal. The interventricular septum is flattened in systole consistent with right ventricular pressure overload.   4. Enlarged right ventricular cavity size and reducedsystolic function.   5. Structurally normal mitral valve with normal leaflet excursion. There is trace mitral regurgitation.   6. Estimated pulmonary artery systolic pressure is 66 mmHg, consistent with severe pulmonary hypertension.   7. No prior echocardiogram is available for comparison.    < end of copied text >

## 2023-11-07 NOTE — ED ADULT TRIAGE NOTE - NS ED TRIAGE AVPU SCALE
Pt arrives to ED with [facility] by EMS. Pt coming from [place] requesting medical clearance. States he needs treatment for bipolar and schizophrenia. Denies hallucinations currently. Pt states he drinks a few beers a week. Denies drug use. Denies SI or HI. Pt agitated upon assessment. Alert x 4.   
Alert-The patient is alert, awake and responds to voice. The patient is oriented to time, place, and person. The triage nurse is able to obtain subjective information.

## 2023-11-07 NOTE — ED ADULT NURSE NOTE - OBJECTIVE STATEMENT
53y M A&Ox4 Transferred by EMS from [place] for cardiology evaluation. As per EMS Today Pt was leaving work when he synopsized in the parking lot falling forward landing on his face. Pt sustained 1x front broken tooth and abrasions to lips. Pt taken to [place] and was found to have B/L PE's. PT was  endorsing dyspnea on exertion with an oxygen sat of 92% and placed on O2. Pt found to be tachycardic and hypertensive. Pt started on Heparin at 17u/hr with a  bolus if 7500 units at 1808. Pt then transferred to [facility] for cardiology evaluation. Upon assessment pt A&Ox4 with no complaints other than soreness to lip. O2 saturation 96% RA. Gross neuro, pulse motor and sensory intact. Pt tachycardic (140) and Hypertensive (140/100).  Additionally pt states he was feeling "off" yesterday. Denies any Blurry vision, dizziness, Ha, N/V/D/Cp/SOb/Gi/Gu symptoms. PMH of HTH and HLD. No PSH.

## 2023-11-07 NOTE — H&P ADULT - ATTENDING COMMENTS
53M HTN, HLD, MINE, chronic LE edema, and FHx of VTE (uncle), initial presentation to [facility] ED after syncopal episode w/ impact to face, found to have b/l PE w/ RHS and transferred to [facility] for vascular cards eval. Pt relays bl health when 11/6 began to experience lightheadedness, which he attributed to dehydration. Sx did not improve w/ hydration, and 11/7 when leaving work in parking lot he felt lightheaded and syncopized w/ trauma to face (resulting in abrasion to lips, chin, and tooth which fell in parking lot). Denies preceding HA, CP, palpitations, SOB; no tongue-bite during episode; did not feel confused after and was able to drive to Aunt's house who brought him to ED. In ED at [facility] he experienced palpitations, currently he states all sx resolved, is feeling generally well.     Of note relays cardiac eval prior to COVID-19 pandemic w/ Dr. Blue (referred given chronic LE edema), states echo/stress wnl.     [facility] ED Course  VS: HR 120s-130s, BP 130s-170/80s-120s  Labs: DDimer 6121; trop I 946, proBNP 2217   Imaging:   - CT brain/C-spine/Maxillofacial:  Left maxillary incisor is missing. Left mandibular molar periapical lucency. Cannot exclude infection.  - CT angio chest PE protocol: Large bilateral pulmonary emboli in the pulmonary arteries extending into the lobar branches with evidence of right heart strain  Received: 1L NS, hep gtt initiated    [facility] ED Course  VS: afebrile tmax 37.3C, tachy 120s-140, BP 130s-150s/90s-100, RR 19-21, SpO2% 95-96 RA  Labs: normocytic (MCV 88.2) anemia H/H 12.2/37.5, thrombocytopenia 93; K 3.4, aphos 130, , ; , proBNP 2348  Micro: unavailable  Imaging:   - CXR clear lungs  - TTE IV septum flattened in systole c/w RV pressure overload, enlarged RV cavity size and reduced sys fn, trace MR, severe pHTN  Received: hep gtt, NS 500cc IV    cards c/s in ED, no intervention, stable for floor     PHYSICAL EXAM:  GENERAL: NAD, well-groomed, well-developed, pleasant to interview  HEAD: +chin abrasion not actively bleeding w/ mild tenderness to palpation   EYES: PERRL, conjunctiva and sclera clear  ENMT: No tonsillar erythema, exudates, or enlargement; Moist mucous membranes +lips w/ abrasions not actively bleeding. Absent front tooth w/o active bleed.   NECK: Supple w/o JVD  NERVOUS SYSTEM: Alert & Oriented X4, Good concentration; Motor Strength 5/5 B/L upper and lower extremities. Sensation equal/intact b/l UE and LE. CN II-XII grossly intact.   CHEST/LUNG: Clear to auscultation bilaterally; No rales, rhonchi, wheezing, or rubs  HEART: +Tachycardic w/ regular rhythm; No murmurs, rubs, or gallops  ABDOMEN: Soft, Nontender, Nondistended; Bowel sounds present. No guarding, rebound tenderness, or rigidity.  EXTREMITIES: 2+ Peripheral Pulses, +nonpitting b/l LE edema, nontender to palpation, not warm/erythematous       #PE  b/l PE, e/o RHS on imaging and HST/proBNP elevated, initiated on hep gtt tx from [facility] to [facility] cards eval'd pt in ED, stable for floor  - c/w hep gtt  - trend HST  - obtain duplex LE r/o DVT  - coag w/u after acute event  - VS q4h, if HDS low threshold for cards/CCU eval  - monitor on tele     #syncope  iso lightheadedness, w/o other preceding/ensuing sx  likely attributable to hemodynamics of PE w/ RHS  trauma to face w/ missing tooth but no other emergent findings on CTH/maxillofacial/c-spine  - monitor tele as above  - dental c/s in AM     #HTN  - hold home antiHTN meds given PE     #HLD  - c/w home statin    #normocytic anemia   unclear bl  - trend CBC    #thrombocytopenia   unclear bl  - trend CBC, c/w hep gtt at this time given risk of PE     #abn LFTs  - trend CMP     #PPx  - chem vte ppx on hep gtt  - DASH/TLC diet  - fall precaution   - dispo pending course   - f/u abn CTH findings non-emergently (retention cyst or polyp medial R maxillary sinus, Left mandibular molar periapical lucency, Mild degenerative changes at C3-4)     case and plan d/w resident Dr. Blackwell, rest as above unless otherwise noted 53M HTN, HLD, MINE, chronic LE edema, and FHx of VTE (uncle), initial presentation to [facility] ED after syncopal episode w/ impact to face, found to have b/l PE w/ RHS and transferred to [facility] for vascular cards eval. Pt relays bl health when 11/6 began to experience lightheadedness, which he attributed to dehydration. Sx did not improve w/ hydration, and 11/7 when leaving work in parking lot he felt lightheaded and syncopized w/ trauma to face (resulting in abrasion to lips, chin, and tooth which fell in parking lot). Denies preceding HA, CP, palpitations, SOB; no tongue-bite during episode; did not feel confused after and was able to drive to Aunt's house who brought him to ED. In ED at [facility] he experienced palpitations, currently he states all sx resolved, is feeling generally well.     Of note relays cardiac eval prior to COVID-19 pandemic w/ Dr. Blue (referred given chronic LE edema), states echo/stress wnl.     [facility] ED Course  VS: HR 120s-130s, BP 130s-170/80s-120s  Labs: DDimer 6121; trop I 946, proBNP 2217   Imaging:   - CT brain/C-spine/Maxillofacial:  Left maxillary incisor is missing. Left mandibular molar periapical lucency. Cannot exclude infection.  - CT angio chest PE protocol: Large bilateral pulmonary emboli in the pulmonary arteries extending into the lobar branches with evidence of right heart strain  Received: 1L NS, hep gtt initiated    [facility] ED Course  VS: afebrile tmax 37.3C, tachy 120s-140, BP 130s-150s/90s-100, RR 19-21, SpO2% 95-96 RA  Labs: normocytic (MCV 88.2) anemia H/H 12.2/37.5, thrombocytopenia 93; K 3.4, aphos 130, , ; , proBNP 2348  Micro: unavailable  Imaging:   - CXR clear lungs  - TTE IV septum flattened in systole c/w RV pressure overload, enlarged RV cavity size and reduced sys fn, trace MR, severe pHTN  Received: hep gtt, NS 500cc IV    cards c/s in ED, no intervention, stable for floor     PHYSICAL EXAM:  GENERAL: NAD, well-groomed, well-developed, pleasant to interview  HEAD: +chin abrasion not actively bleeding w/ mild tenderness to palpation   EYES: PERRL, conjunctiva and sclera clear  ENMT: No tonsillar erythema, exudates, or enlargement; Moist mucous membranes +lips w/ abrasions not actively bleeding. Absent front tooth w/o active bleed.   NECK: Supple w/o JVD  NERVOUS SYSTEM: Alert & Oriented X4, Good concentration; Motor Strength 5/5 B/L upper and lower extremities. Sensation equal/intact b/l UE and LE. CN II-XII grossly intact.   CHEST/LUNG: Clear to auscultation bilaterally; No rales, rhonchi, wheezing, or rubs  HEART: +Tachycardic w/ regular rhythm; No murmurs, rubs, or gallops  ABDOMEN: Soft, Nontender, Nondistended; Bowel sounds present. No guarding, rebound tenderness, or rigidity.  EXTREMITIES: 2+ Peripheral Pulses, +nonpitting b/l LE edema, nontender to palpation, not warm/erythematous       #PE  b/l PE, e/o RHS on imaging and HST/proBNP elevated, initiated on hep gtt tx from [facility] to [facility] cards eval'd pt in ED, stable for floor  - c/w hep gtt  - trend HST  - obtain duplex LE r/o DVT  - coag w/u after acute event  - VS q4h, if HD unstable low threshold for cards/CCU eval  - monitor on tele     #syncope  iso lightheadedness, w/o other preceding/ensuing sx  likely attributable to hemodynamics of PE w/ RHS  trauma to face w/ missing tooth but no other emergent findings on CTH/maxillofacial/c-spine  - monitor tele as above  - dental c/s in AM     #HTN  - hold home antiHTN meds given PE     #HLD  - c/w home statin    #normocytic anemia   unclear bl  - trend CBC    #thrombocytopenia   unclear bl  - trend CBC, c/w hep gtt at this time given risk of PE     #abn LFTs  - trend CMP     #PPx  - chem vte ppx on hep gtt  - DASH/TLC diet  - fall precaution   - dispo pending course   - f/u abn CTH findings non-emergently (retention cyst or polyp medial R maxillary sinus, Left mandibular molar periapical lucency, Mild degenerative changes at C3-4)     case and plan d/w resident Dr. Blackwell, rest as above unless otherwise noted 53M HTN, HLD, MINE, chronic LE edema, and FHx of VTE (uncle), initial presentation to [facility] ED after syncopal episode w/ impact to face, found to have b/l PE w/ RHS and transferred to [facility] for vascular cards eval. Pt relays bl health when 11/6 began to experience lightheadedness, which he attributed to dehydration. Sx did not improve w/ hydration, and 11/7 when leaving work in parking lot he felt lightheaded and syncopized w/ trauma to face (resulting in abrasion to lips, chin, and tooth which fell in parking lot). Denies preceding HA, CP, palpitations, SOB; no tongue-bite during episode; did not feel confused after and was able to drive to Aunt's house who brought him to ED. In ED at [facility] he experienced palpitations, currently he states all sx resolved, is feeling generally well.     Of note relays cardiac eval prior to COVID-19 pandemic w/ Dr. Blue (referred given chronic LE edema), states echo/stress wnl.     [facility] ED Course  VS: HR 120s-130s, BP 130s-170/80s-120s  Labs: DDimer 6121; trop I 946, proBNP 2217   Imaging:   - CT brain/C-spine/Maxillofacial:  Left maxillary incisor is missing. Left mandibular molar periapical lucency. Cannot exclude infection.  - CT angio chest PE protocol: Large bilateral pulmonary emboli in the pulmonary arteries extending into the lobar branches with evidence of right heart strain  Received: 1L NS, hep gtt initiated    [facility] ED Course  VS: afebrile tmax 37.3C, tachy 120s-140, BP 130s-150s/90s-100, RR 19-21, SpO2% 95-96 RA  Labs: normocytic (MCV 88.2) anemia H/H 12.2/37.5, thrombocytopenia 93; K 3.4, aphos 130, , ; , proBNP 2348  Micro: unavailable  Imaging:   - CXR clear lungs  - TTE IV septum flattened in systole c/w RV pressure overload, enlarged RV cavity size and reduced sys fn, trace MR, severe pHTN  Received: hep gtt, NS 500cc IV    EKG personally reviewed sinus rhythm, tachycardic HR 131BPM, QTc prolonged 460ms, 2mm JORDANA V2, 1mm JORDANA V3, TWI III, V2-V4. Cards c/s in ED reviewed imaging/EKG with no intervention, stable for floor     PHYSICAL EXAM:  GENERAL: NAD, well-groomed, well-developed, pleasant to interview  HEAD: +chin abrasion not actively bleeding w/ mild tenderness to palpation   EYES: PERRL, conjunctiva and sclera clear  ENMT: No tonsillar erythema, exudates, or enlargement; Moist mucous membranes +lips w/ abrasions not actively bleeding. Absent front tooth w/o active bleed.   NECK: Supple w/o JVD  NERVOUS SYSTEM: Alert & Oriented X4, Good concentration; Motor Strength 5/5 B/L upper and lower extremities. Sensation equal/intact b/l UE and LE. CN II-XII grossly intact.   CHEST/LUNG: Clear to auscultation bilaterally; No rales, rhonchi, wheezing, or rubs  HEART: +Tachycardic w/ regular rhythm; No murmurs, rubs, or gallops  ABDOMEN: Soft, Nontender, Nondistended; Bowel sounds present. No guarding, rebound tenderness, or rigidity.  EXTREMITIES: 2+ Peripheral Pulses, +nonpitting b/l LE edema, nontender to palpation, not warm/erythematous       #PE  b/l PE, e/o RHS on imaging and HST/proBNP elevated, initiated on hep gtt tx from [facility] to [facility] cards eval'd pt in ED, stable for floor  - c/w hep gtt  - trend HST  - obtain duplex LE r/o DVT  - coag w/u after acute event  - VS q4h, if HD unstable low threshold for cards/CCU eval  - monitor on tele     #syncope  iso lightheadedness, w/o other preceding/ensuing sx  likely attributable to hemodynamics of PE w/ RHS  trauma to face w/ missing tooth but no other emergent findings on CTH/maxillofacial/c-spine  - monitor tele as above  - dental c/s in AM     #HTN  - hold home antiHTN meds given PE     #HLD  - c/w home statin    #normocytic anemia   unclear bl  - trend CBC    #thrombocytopenia   unclear bl  - trend CBC, c/w hep gtt at this time given risk of PE     #abn LFTs  - trend CMP     #PPx  - chem vte ppx on hep gtt  - DASH/TLC diet  - fall precaution   - dispo pending course   - f/u abn CTH findings non-emergently (retention cyst or polyp medial R maxillary sinus, Left mandibular molar periapical lucency, Mild degenerative changes at C3-4)     case and plan d/w resident Dr. Blackwell, rest as above unless otherwise noted 53M HTN, HLD, MINE, chronic LE edema, and FHx of VTE (uncle), initial presentation to [facility] ED after syncopal episode w/ impact to face, found to have b/l PE w/ RHS and transferred to [facility] for vascular cards eval. Pt relays bl health when 11/6 began to experience lightheadedness, which he attributed to dehydration. Sx did not improve w/ hydration, and 11/7 when leaving work in parking lot he felt lightheaded and syncopized w/ trauma to face (resulting in abrasion to lips, chin, and tooth which fell in parking lot). Denies preceding HA, CP, palpitations, SOB; no tongue-bite during episode; did not feel confused after and was able to drive to Aunt's house who brought him to ED. In ED at [facility] he experienced palpitations, currently he states all sx resolved, is feeling generally well.     Of note relays cardiac eval prior to COVID-19 pandemic w/ Dr. Blue (referred given chronic LE edema), states echo/stress wnl.     [facility] ED Course  VS: HR 120s-130s, BP 130s-170/80s-120s  Labs: DDimer 6121; trop I 946, proBNP 2217   Imaging:   - CT brain/C-spine/Maxillofacial:  Left maxillary incisor is missing. Left mandibular molar periapical lucency. Cannot exclude infection.  - CT angio chest PE protocol: Large bilateral pulmonary emboli in the pulmonary arteries extending into the lobar branches with evidence of right heart strain  Received: 1L NS, hep gtt initiated    [facility] ED Course  VS: afebrile tmax 37.3C, tachy 120s-140, BP 130s-150s/90s-100, RR 19-21, SpO2% 95-96 RA  Labs: normocytic (MCV 88.2) anemia H/H 12.2/37.5, thrombocytopenia 93; K 3.4, aphos 130, , ; , proBNP 2348  Micro: unavailable  Imaging:   - CXR clear lungs  - TTE IV septum flattened in systole c/w RV pressure overload, enlarged RV cavity size and reduced sys fn, trace MR, severe pHTN  Received: hep gtt, NS 500cc IV    EKG personally reviewed sinus rhythm, tachycardic HR 131BPM, QTc prolonged 460ms, 2mm JORDANA V2, 1mm JORDANA V3, TWI III, V2-V4. Cards c/s in ED reviewed imaging/EKG with no intervention, stable for floor     PHYSICAL EXAM:  GENERAL: NAD, well-groomed, well-developed, pleasant to interview  HEAD: +chin abrasion not actively bleeding w/ mild tenderness to palpation   EYES: PERRL, conjunctiva and sclera clear  ENMT: No tonsillar erythema, exudates, or enlargement; Moist mucous membranes +lips w/ abrasions not actively bleeding. Absent front tooth w/o active bleed.   NECK: Supple w/o JVD  NERVOUS SYSTEM: Alert & Oriented X4, Good concentration; Motor Strength 5/5 B/L upper and lower extremities. Sensation equal/intact b/l UE and LE. CN II-XII grossly intact.   CHEST/LUNG: Clear to auscultation bilaterally; No rales, rhonchi, wheezing, or rubs  HEART: +Tachycardic w/ regular rhythm; No murmurs, rubs, or gallops  ABDOMEN: Soft, Nontender, Nondistended; Bowel sounds present. No guarding, rebound tenderness, or rigidity.  EXTREMITIES: 2+ Peripheral Pulses, +nonpitting b/l LE edema, nontender to palpation, not warm/erythematous       #PE  b/l PE, e/o RHS on imaging and HST/proBNP elevated, initiated on hep gtt tx from [facility] to [facility] cards eval'd pt in ED, stable for floor  - c/w hep gtt  - trend HST, will touch base w/ cards fellow re EKG changes  - obtain duplex LE r/o DVT  - coag w/u after acute event  - VS q4h, if HD unstable low threshold for cards/CCU eval  - monitor on tele     #syncope  iso lightheadedness, w/o other preceding/ensuing sx  likely attributable to hemodynamics of PE w/ RHS  trauma to face w/ missing tooth but no other emergent findings on CTH/maxillofacial/c-spine  - monitor tele as above  - dental c/s in AM     #HTN  - hold home antiHTN meds given PE     #HLD  - c/w home statin    #normocytic anemia   unclear bl  - trend CBC    #thrombocytopenia   unclear bl  - trend CBC, c/w hep gtt at this time given risk of PE     #abn LFTs  - trend CMP     #PPx  - chem vte ppx on hep gtt  - DASH/TLC diet  - fall precaution   - dispo pending course   - f/u abn CTH findings non-emergently (retention cyst or polyp medial R maxillary sinus, Left mandibular molar periapical lucency, Mild degenerative changes at C3-4)     case and plan d/w resident Dr. Blackwell, rest as above unless otherwise noted

## 2023-11-07 NOTE — ED PROVIDER NOTE - ATTENDING CONTRIBUTION TO CARE
53-year-old history of hypertension hyperlipidemia presenting to the ED at [facility] secondary to passing out going to the parking lot after work.  Yesterday patient felt dehydrated and drank a lot of water today felt similar.  Patient has no history of recent illness and has been in his usual state of health.  Patient has no family history of cancer and no personal history of weight loss or weight gain unintentionally.  Was noted to have a PE on CTA at [facility] and was transferred here for cardiology evaluation of right heart strain and potential interventional recommendations.  Patient has no active chest pain, shortness of breath.  Normocephalic/atraumatic, speaking in shorter sentences, trachea midline, bilateral breath sounds, tachycardic 130s on auscultation, abdomen nondistended, no deformity of extremities, no edema of lower extremities bilaterally, no rash/petechiae/vesicles, cooperative with capacity and insight  Danyel Bustos MD, FACEP: In this physician's medical judgement based on clinical history and physical exam the patient's signs and symptoms lead to differential diagnoses which includes but is not limited to: PE with right heart strain, NSTEMI secondary to PE and demand    Historical features, symptoms, and clinical exam not consistent with: Aortic dissection    Labs were ordered and independently reviewed by me.  EKG was ordered and independently reviewed by me.  Imaging was ordered and reviewed by me.    Appropriate medications for the patient's presenting complaints were ordered, and effects were reassessed.    Patient's records including prior hospital visit, med and medical history were reviewed.  History assisted by EMS  Escalation to admission/observation was considered.    Will follow up on labs, therapeutics, imaging, reassess and disposition as clinically indicated.  *The above represents an initial assessment/impression. Please refer to my progress notes below for potential changes in patient clinical course*

## 2023-11-07 NOTE — ED PROVIDER NOTE - IV ALTEPLASE ADMIN OUTSIDE HIDDEN
Patient scheduled EGD, Dr Baires, IV, 9/9/21 at 9:30am. Told to arrive at 8:30am and needs a . Patient had covid vaccine. Gave patient letter. Will close encounter.   
Rescheduled patient to Thursday 8/26/21 at 11:30 am with Dr. Baires.    S GI and kenyon notified.  
show

## 2023-11-08 DIAGNOSIS — R09.89 OTHER SPECIFIED SYMPTOMS AND SIGNS INVOLVING THE CIRCULATORY AND RESPIRATORY SYSTEMS: ICD-10-CM

## 2023-11-08 DIAGNOSIS — I26.99 OTHER PULMONARY EMBOLISM WITHOUT ACUTE COR PULMONALE: ICD-10-CM

## 2023-11-08 DIAGNOSIS — Z29.9 ENCOUNTER FOR PROPHYLACTIC MEASURES, UNSPECIFIED: ICD-10-CM

## 2023-11-08 LAB
ALBUMIN SERPL ELPH-MCNC: 4 G/DL — SIGNIFICANT CHANGE UP (ref 3.3–5)
ALBUMIN SERPL ELPH-MCNC: 4 G/DL — SIGNIFICANT CHANGE UP (ref 3.3–5)
ALP SERPL-CCNC: 119 U/L — SIGNIFICANT CHANGE UP (ref 40–120)
ALP SERPL-CCNC: 119 U/L — SIGNIFICANT CHANGE UP (ref 40–120)
ALT FLD-CCNC: 82 U/L — HIGH (ref 10–45)
ALT FLD-CCNC: 82 U/L — HIGH (ref 10–45)
ANION GAP SERPL CALC-SCNC: 15 MMOL/L — SIGNIFICANT CHANGE UP (ref 5–17)
ANION GAP SERPL CALC-SCNC: 15 MMOL/L — SIGNIFICANT CHANGE UP (ref 5–17)
APTT BLD: 72.8 SEC — HIGH (ref 24.5–35.6)
APTT BLD: 72.8 SEC — HIGH (ref 24.5–35.6)
APTT BLD: 76.5 SEC — HIGH (ref 24.5–35.6)
APTT BLD: 76.5 SEC — HIGH (ref 24.5–35.6)
AST SERPL-CCNC: 64 U/L — HIGH (ref 10–40)
AST SERPL-CCNC: 64 U/L — HIGH (ref 10–40)
BILIRUB SERPL-MCNC: 0.8 MG/DL — SIGNIFICANT CHANGE UP (ref 0.2–1.2)
BILIRUB SERPL-MCNC: 0.8 MG/DL — SIGNIFICANT CHANGE UP (ref 0.2–1.2)
BUN SERPL-MCNC: 8 MG/DL — SIGNIFICANT CHANGE UP (ref 7–23)
BUN SERPL-MCNC: 8 MG/DL — SIGNIFICANT CHANGE UP (ref 7–23)
CALCIUM SERPL-MCNC: 9.9 MG/DL — SIGNIFICANT CHANGE UP (ref 8.4–10.5)
CALCIUM SERPL-MCNC: 9.9 MG/DL — SIGNIFICANT CHANGE UP (ref 8.4–10.5)
CHLORIDE SERPL-SCNC: 103 MMOL/L — SIGNIFICANT CHANGE UP (ref 96–108)
CHLORIDE SERPL-SCNC: 103 MMOL/L — SIGNIFICANT CHANGE UP (ref 96–108)
CO2 SERPL-SCNC: 22 MMOL/L — SIGNIFICANT CHANGE UP (ref 22–31)
CO2 SERPL-SCNC: 22 MMOL/L — SIGNIFICANT CHANGE UP (ref 22–31)
CREAT SERPL-MCNC: 0.9 MG/DL — SIGNIFICANT CHANGE UP (ref 0.5–1.3)
CREAT SERPL-MCNC: 0.9 MG/DL — SIGNIFICANT CHANGE UP (ref 0.5–1.3)
EGFR: 101 ML/MIN/1.73M2 — SIGNIFICANT CHANGE UP
EGFR: 101 ML/MIN/1.73M2 — SIGNIFICANT CHANGE UP
GLUCOSE SERPL-MCNC: 200 MG/DL — HIGH (ref 70–99)
GLUCOSE SERPL-MCNC: 200 MG/DL — HIGH (ref 70–99)
HCT VFR BLD CALC: 36.5 % — LOW (ref 39–50)
HCT VFR BLD CALC: 36.5 % — LOW (ref 39–50)
HGB BLD-MCNC: 12.2 G/DL — LOW (ref 13–17)
HGB BLD-MCNC: 12.2 G/DL — LOW (ref 13–17)
INR BLD: 1.12 RATIO — SIGNIFICANT CHANGE UP (ref 0.85–1.18)
INR BLD: 1.12 RATIO — SIGNIFICANT CHANGE UP (ref 0.85–1.18)
MAGNESIUM SERPL-MCNC: 1.7 MG/DL — SIGNIFICANT CHANGE UP (ref 1.6–2.6)
MAGNESIUM SERPL-MCNC: 1.7 MG/DL — SIGNIFICANT CHANGE UP (ref 1.6–2.6)
MCHC RBC-ENTMCNC: 28.8 PG — SIGNIFICANT CHANGE UP (ref 27–34)
MCHC RBC-ENTMCNC: 28.8 PG — SIGNIFICANT CHANGE UP (ref 27–34)
MCHC RBC-ENTMCNC: 33.4 GM/DL — SIGNIFICANT CHANGE UP (ref 32–36)
MCHC RBC-ENTMCNC: 33.4 GM/DL — SIGNIFICANT CHANGE UP (ref 32–36)
MCV RBC AUTO: 86.3 FL — SIGNIFICANT CHANGE UP (ref 80–100)
MCV RBC AUTO: 86.3 FL — SIGNIFICANT CHANGE UP (ref 80–100)
NRBC # BLD: 0 /100 WBCS — SIGNIFICANT CHANGE UP (ref 0–0)
NRBC # BLD: 0 /100 WBCS — SIGNIFICANT CHANGE UP (ref 0–0)
PHOSPHATE SERPL-MCNC: 1.4 MG/DL — LOW (ref 2.5–4.5)
PHOSPHATE SERPL-MCNC: 1.4 MG/DL — LOW (ref 2.5–4.5)
PLATELET # BLD AUTO: 91 K/UL — LOW (ref 150–400)
PLATELET # BLD AUTO: 91 K/UL — LOW (ref 150–400)
POTASSIUM SERPL-MCNC: 3.4 MMOL/L — LOW (ref 3.5–5.3)
POTASSIUM SERPL-MCNC: 3.4 MMOL/L — LOW (ref 3.5–5.3)
POTASSIUM SERPL-SCNC: 3.4 MMOL/L — LOW (ref 3.5–5.3)
POTASSIUM SERPL-SCNC: 3.4 MMOL/L — LOW (ref 3.5–5.3)
PROT SERPL-MCNC: 7.4 G/DL — SIGNIFICANT CHANGE UP (ref 6–8.3)
PROT SERPL-MCNC: 7.4 G/DL — SIGNIFICANT CHANGE UP (ref 6–8.3)
PROTHROM AB SERPL-ACNC: 12.3 SEC — SIGNIFICANT CHANGE UP (ref 9.5–13)
PROTHROM AB SERPL-ACNC: 12.3 SEC — SIGNIFICANT CHANGE UP (ref 9.5–13)
RBC # BLD: 4.23 M/UL — SIGNIFICANT CHANGE UP (ref 4.2–5.8)
RBC # BLD: 4.23 M/UL — SIGNIFICANT CHANGE UP (ref 4.2–5.8)
RBC # FLD: 13.4 % — SIGNIFICANT CHANGE UP (ref 10.3–14.5)
RBC # FLD: 13.4 % — SIGNIFICANT CHANGE UP (ref 10.3–14.5)
SODIUM SERPL-SCNC: 140 MMOL/L — SIGNIFICANT CHANGE UP (ref 135–145)
SODIUM SERPL-SCNC: 140 MMOL/L — SIGNIFICANT CHANGE UP (ref 135–145)
TROPONIN T, HIGH SENSITIVITY RESULT: 88 NG/L — HIGH (ref 0–51)
TROPONIN T, HIGH SENSITIVITY RESULT: 88 NG/L — HIGH (ref 0–51)
WBC # BLD: 8.53 K/UL — SIGNIFICANT CHANGE UP (ref 3.8–10.5)
WBC # BLD: 8.53 K/UL — SIGNIFICANT CHANGE UP (ref 3.8–10.5)
WBC # FLD AUTO: 8.53 K/UL — SIGNIFICANT CHANGE UP (ref 3.8–10.5)
WBC # FLD AUTO: 8.53 K/UL — SIGNIFICANT CHANGE UP (ref 3.8–10.5)

## 2023-11-08 PROCEDURE — 93970 EXTREMITY STUDY: CPT | Mod: 26

## 2023-11-08 PROCEDURE — 99223 1ST HOSP IP/OBS HIGH 75: CPT | Mod: GC

## 2023-11-08 PROCEDURE — 99223 1ST HOSP IP/OBS HIGH 75: CPT

## 2023-11-08 PROCEDURE — 99254 IP/OBS CNSLTJ NEW/EST MOD 60: CPT | Mod: GC

## 2023-11-08 RX ORDER — POTASSIUM PHOSPHATE, MONOBASIC POTASSIUM PHOSPHATE, DIBASIC 236; 224 MG/ML; MG/ML
30 INJECTION, SOLUTION INTRAVENOUS ONCE
Refills: 0 | Status: COMPLETED | OUTPATIENT
Start: 2023-11-08 | End: 2023-11-08

## 2023-11-08 RX ORDER — MAGNESIUM SULFATE 500 MG/ML
2 VIAL (ML) INJECTION ONCE
Refills: 0 | Status: COMPLETED | OUTPATIENT
Start: 2023-11-08 | End: 2023-11-08

## 2023-11-08 RX ORDER — ATORVASTATIN CALCIUM 80 MG/1
20 TABLET, FILM COATED ORAL AT BEDTIME
Refills: 0 | Status: DISCONTINUED | OUTPATIENT
Start: 2023-11-08 | End: 2023-11-11

## 2023-11-08 RX ORDER — POTASSIUM CHLORIDE 20 MEQ
40 PACKET (EA) ORAL ONCE
Refills: 0 | Status: COMPLETED | OUTPATIENT
Start: 2023-11-08 | End: 2023-11-08

## 2023-11-08 RX ORDER — LISINOPRIL 2.5 MG/1
5 TABLET ORAL DAILY
Refills: 0 | Status: DISCONTINUED | OUTPATIENT
Start: 2023-11-08 | End: 2023-11-08

## 2023-11-08 RX ADMIN — HEPARIN SODIUM 1700 UNIT(S)/HR: 5000 INJECTION INTRAVENOUS; SUBCUTANEOUS at 10:13

## 2023-11-08 RX ADMIN — HEPARIN SODIUM 1700 UNIT(S)/HR: 5000 INJECTION INTRAVENOUS; SUBCUTANEOUS at 01:49

## 2023-11-08 RX ADMIN — Medication 40 MILLIEQUIVALENT(S): at 15:03

## 2023-11-08 RX ADMIN — ATORVASTATIN CALCIUM 20 MILLIGRAM(S): 80 TABLET, FILM COATED ORAL at 20:40

## 2023-11-08 RX ADMIN — HEPARIN SODIUM 1700 UNIT(S)/HR: 5000 INJECTION INTRAVENOUS; SUBCUTANEOUS at 19:48

## 2023-11-08 RX ADMIN — HEPARIN SODIUM 1700 UNIT(S)/HR: 5000 INJECTION INTRAVENOUS; SUBCUTANEOUS at 07:35

## 2023-11-08 RX ADMIN — POTASSIUM PHOSPHATE, MONOBASIC POTASSIUM PHOSPHATE, DIBASIC 83.33 MILLIMOLE(S): 236; 224 INJECTION, SOLUTION INTRAVENOUS at 20:40

## 2023-11-08 RX ADMIN — Medication 25 GRAM(S): at 14:11

## 2023-11-08 RX ADMIN — HEPARIN SODIUM 1700 UNIT(S)/HR: 5000 INJECTION INTRAVENOUS; SUBCUTANEOUS at 07:56

## 2023-11-08 NOTE — PROGRESS NOTE ADULT - SUBJECTIVE AND OBJECTIVE BOX
24H hour events: No over night events.  Patient complains of facial pain secondary to syncopal event/ tooth fracture prior to admission.     MEDICATIONS:  heparin  Infusion.  Unit(s)/Hr IV Continuous <Continuous>    atorvastatin 20 milliGRAM(s) Oral at bedtime    REVIEW OF SYSTEMS:  Complete 12point ROS negative.    PHYSICAL EXAM:  T(C): 36.7 (11-08-23 @ 10:00), Max: 37.3 (11-07-23 @ 19:31)  HR: 126 (11-08-23 @ 10:00) (116 - 140)  BP: 119/83 (11-08-23 @ 10:00) (119/83 - 156/96)  RR: 18 (11-08-23 @ 10:00) (18 - 21)  SpO2: 97% (11-08-23 @ 10:00) (95% - 97%)      Appearance: Normal, loss of front tooth  HEENT:   Normal oral mucosa, PERRL, EOMI	  Cardiovascular: Normal S1 S2, regular. No JVD, No murmurs, No edema  Respiratory: Lungs clear to auscultation	  Psychiatry: A & O x 3, Mood & affect appropriate  Gastrointestinal:  Soft, Non-tender, + BS	  Skin: No rashes, No ecchymoses, No cyanosis	  Extremities: Normal range of motion, No clubbing, cyanosis or edema  Vascular: Peripheral pulses palpable 2+ bilaterally      LABS:	 	    CBC Full  -  ( 08 Nov 2023 09:05 )  WBC Count : 8.53 K/uL  Hemoglobin : 12.2 g/dL  Hematocrit : 36.5 %  Platelet Count - Automated : 91 K/uL  Mean Cell Volume : 86.3 fl  Mean Cell Hemoglobin : 28.8 pg  Mean Cell Hemoglobin Concentration : 33.4 gm/dL  Auto Neutrophil # : x  Auto Lymphocyte # : x  Auto Monocyte # : x  Auto Eosinophil # : x  Auto Basophil # : x  Auto Neutrophil % : x  Auto Lymphocyte % : x  Auto Monocyte % : x  Auto Eosinophil % : x  Auto Basophil % : x    11-08    140  |  103  |  8   ----------------------------<  200<H>  3.4<L>   |  22  |  0.90  11-07    139  |  104  |  14  ----------------------------<  150<H>  3.4<L>   |  22  |  1.12    Ca    9.9      08 Nov 2023 09:05  Ca    9.7      07 Nov 2023 20:07  Phos  1.4     11-08  Mg     1.7     11-08  Mg     1.9     11-07    TPro  7.4  /  Alb  4.0  /  TBili  0.8  /  DBili  x   /  AST  64<H>  /  ALT  82<H>  /  AlkPhos  119  11-08  TPro  7.5  /  Alb  4.3  /  TBili  0.4  /  DBili  x   /  AST  157<H>  /  ALT  115<H>  /  AlkPhos  130<H>  11-07      TELEMETRY: 	Sinus Tachycardia 120's       TTE:    TRANSTHORACIC ECHOCARDIOGRAM REPORT  ________________________________________________________________________________                                      _______       Pt. Name:       PHUONG NI Study Date:    11/7/2023  MRN:            [id number]          YOB: 1970  Accession #:    [id number]           Age:           53 years  Account#:       [id number]        Gender:        M  Heart Rate:     133 bpm             Height:        72.00 in (182.88 cm)  Rhythm:                 Weight:        208.00 lb (94.35 kg)  Blood Pressure: 161/117 mmHg        BSA/BMI:       2.17 m² / 28.21 kg/m²  ________________________________________________________________________________________  Referring Physician:    MARY MARY  Interpreting Physician: Polo Herrmann M.D.  Primary Sonographer:    [facility]  Fellow (Performing):    Kameron Austin MD    CPT:               MYOCARDIAL STRAIN IMAGING - 92449.m;ECHO TTE WO CON COMP W                     DOPP - 96528.m  Indication(s):     Abnormal electrocardiogram ECG/EKG - R94.31  Procedure:         Transthoracic echocardiogram with 2-D, M-mode and complete                     spectral and color flow Doppler. Strain imaging performed for         evaluation of regional and global myocardial shape and                     dimensions.  Ordering Location: [facility]  Admission Status:  Inpatient  Study Information: Image quality for this study is technically difficult.    _______________________________________________________________________________________     CONCLUSIONS:      1. Technically difficult image quality.   2. Left ventricular endocardium is not well visualized; however, the left ventricular systolic function appears grossly normal.   3. Left ventricular cavity is normal. Left ventricular wall thickness is normal. The interventricular septum is flattened in systole consistent with right ventricular pressure overload.   4. Enlarged right ventricular cavity size and reducedsystolic function.   5. Structurally normal mitral valve with normal leaflet excursion. There is trace mitral regurgitation.   6. Estimated pulmonary artery systolic pressure is 66 mmHg, consistent with severe pulmonary hypertension.   7. No prior echocardiogram is available for comparison.    ________________________________________________________________________________________  FINDINGS:     Left Ventricle:  The left ventricle was not well visualized. The left ventricular cavity is normal. Left ventricular wall thickness is normal. The interventricular septum is flattened in systole consistent with right ventricular pressure overload. Left ventricular endocardium is not well visualized; however, the left ventricular systolic function appears grossly normal.     Right Ventricle:  The right ventricular cavity is enlarged in size and reduced systolic function. The right ventricular fractional area change is 15.6 %.     Left Atrium:  The left atrium is normal in size.     Right Atrium:  The right atrium is normal in size with an indexed volume of 35.55 ml/m² and an indexed area of 10.16 cm²/m².     Aortic Valve:  Aortic valve leaflet morphology not well visualized. There is no evidence of aortic regurgitation.     Mitral Valve:  Structurally normal mitral valve with normal leaflet excursion. There is trace mitral regurgitation.     Tricuspid Valve:  Structurally normal tricuspid valve with normal leaflet excursion. There is mild to moderate tricuspid regurgitation. Estimated pulmonary artery systolic pressure is 66 mmHg, consistent with severe pulmonary hypertension.     Pulmonic Valve:  Structurally normal pulmonic valve with normal leaflet excursion. There is mild pulmonic regurgitation.     Aorta:  The aortic annulus and aortic root appear normal in size.     Pericardium:  No pericardial effusion seen.     Systemic Veins:  The inferior vena cava is dilated measuring 2.90 cm in diameter, (dilated >2.1cm) with abnormal inspiratory collapse (abnormal <50%) consistent with elevated right atrial pressure (~15, range 10-20mmHg).  ____________________________________________________________________  Quantitative Data:  Left Ventricle Measurements: (Indexed to BSA)     IVSd (2D):   0.7 cm  LVPWd (2D):  0.8 cm  LVIDd (2D):  4.3 cm  LVIDs (2D):  2.8 cm  LV Mass:     97 g   44.7 g/m²     MV E Vmax:    0.60 m/s  MV A Vmax:    0.93 m/s  MV E/A:       0.65  e' lateral:   5.00 cm/s  e' medial:    7.18 cm/s  E/e' lateral: 12.06  E/e' medial:  8.40  E/e' Average: 9.90  MV DT:    100 msec    Aorta Measurements: (normal range) (Indexed to BSA)     Sinuses of Valsalva: 3.65 cm (3.1 - 3.7 cm)       Left Atrium Measurements: (Indexed to BSA)  LA Diam 2D: 2.45 cm    Right Ventricle Measurements: Right Atrial Measurements:     TV Dana. S': 11.20 cm/s        RA Vol:       77.00 ml  RV FAC:     15.6 %            RA Vol Index: 35.55 ml/m²    Mitral Valve Measurements:     MV E Vmax: 0.6 m/s  MV A Vmax: 0.9 m/s  MV E/A:    0.6       Tricuspid Valve Measurements:     TR Vmax:     3.6 m/s  TR Peak Gradient: 51.3 mmHg  RA Pressure:      15 mmHg  PASP:             66 mmHg    ________________________________________________________________________________________  Electronically signed on 11/7/2023 at 9:39:34 PM by Polo Herrmann M.D.         *** Final ***

## 2023-11-08 NOTE — PROGRESS NOTE ADULT - SUBJECTIVE AND OBJECTIVE BOX
Patient is a 53y old  Male who presents with a chief complaint of Pulmonary embolism w/ R heart strain (08 Nov 2023 10:52)      SUBJECTIVE / OVERNIGHT EVENTS: Pt in bed, feels well.     MEDICATIONS  (STANDING):  atorvastatin 20 milliGRAM(s) Oral at bedtime  heparin  Infusion.  Unit(s)/Hr (17 mL/Hr) IV Continuous <Continuous>  magnesium sulfate  IVPB 2 Gram(s) IV Intermittent once  potassium chloride    Tablet ER 40 milliEquivalent(s) Oral once  potassium phosphate IVPB 30 milliMole(s) IV Intermittent once    MEDICATIONS  (PRN):      CAPILLARY BLOOD GLUCOSE        I&O's Summary      PHYSICAL EXAM:  T(C): 36.1 (11-08-23 @ 12:10), Max: 37.3 (11-07-23 @ 19:31)  HR: 124 (11-08-23 @ 12:10) (116 - 140)  BP: 118/79 (11-08-23 @ 12:10) (118/79 - 156/96)  RR: 18 (11-08-23 @ 12:10) (18 - 21)  SpO2: 97% (11-08-23 @ 12:10) (95% - 97%)  CONSTITUTIONAL: NAD, well-developed, well-groomed  EYES: PERRLA; conjunctiva and sclera clear  ENMT: Moist oral mucosa; broken teeth  NECK: Supple, no palpable masses; no thyromegaly  RESPIRATORY: Normal respiratory effort; lungs are clear to auscultation bilaterally  CARDIOVASCULAR: Regular rate and rhythm, normal S1 and S2, no murmur/rub/gallop; No lower extremity edema; Peripheral pulses are 2+ bilaterally  ABDOMEN: Nontender to palpation, normoactive bowel sounds, no rebound/guarding; No hepatosplenomegaly  MUSCULOSKELETAL:  Normal gait; no clubbing or cyanosis of digits; no joint swelling or tenderness to palpation  PSYCH: A+O to person, place, and time; affect appropriate  NEUROLOGY: CN 2-12 are intact and symmetric; no gross sensory deficits   SKIN: No rashes; no palpable lesions    LABS:                        12.2   8.53  )-----------( 91       ( 08 Nov 2023 09:05 )             36.5     11-08    140  |  103  |  8   ----------------------------<  200<H>  3.4<L>   |  22  |  0.90    Ca    9.9      08 Nov 2023 09:05  Phos  1.4     11-08  Mg     1.7     11-08    TPro  7.4  /  Alb  4.0  /  TBili  0.8  /  DBili  x   /  AST  64<H>  /  ALT  82<H>  /  AlkPhos  119  11-08    PT/INR - ( 08 Nov 2023 00:32 )   PT: 12.3 sec;   INR: 1.12 ratio         PTT - ( 08 Nov 2023 09:05 )  PTT:76.5 sec      Urinalysis Basic - ( 08 Nov 2023 09:05 )    Color: x / Appearance: x / SG: x / pH: x  Gluc: 200 mg/dL / Ketone: x  / Bili: x / Urobili: x   Blood: x / Protein: x / Nitrite: x   Leuk Esterase: x / RBC: x / WBC x   Sq Epi: x / Non Sq Epi: x / Bacteria: x        RADIOLOGY & ADDITIONAL TESTS:    Imaging Personally Reviewed:    Consultant(s) Notes Reviewed:      Care Discussed with Consultants/Other Providers: NP

## 2023-11-08 NOTE — PROGRESS NOTE ADULT - ASSESSMENT
53 y.o. Male with HTN, HLD who presents as a transfer from [place] for syncope, found to have bilateral PE with right heart strain.  Patient admits to syncope x 2 episodes the last 2 days both with prodrome of dizziness.  First syncopal episode was while seated at his desk with prodrome of dizziness.  The next day he was leaving work and walking to the car when he had sudden dizziness and loss of consciousness with facial/ dental trauma.  No loss of bowel/ bladder after syncope.  Denies any prior syncope.       1. Syncope most likely related to acute PE with R heart strain.    2. Acute PE    - EKG reviewed with EP attending, ST elevations seen on V1-V4. ST elevations are likely secondary to Right heart strain and unlikely Brugada as they are seen through lead V4.   - Would obtain Genetic consult with Dr. Chago Rudolph as patient's mother was  age 40 from unclear sudden circumstance.   - Currently on IV Heparin for PE   - Cardiology follow up for Right heart strain secondary to PE    DORA Howard [facility]  [phone]  53 y.o. Male with HTN, HLD who presents as a transfer from [place] for syncope, found to have bilateral PE with right heart strain.  Patient admits to syncope x 2 episodes the last 2 days both with prodrome of dizziness.  First syncopal episode was while seated at his desk with prodrome of dizziness.  The next day he was leaving work and walking to the car when he had sudden dizziness and loss of consciousness with facial/ dental trauma.  No loss of bowel/ bladder after syncope.  Denies any prior syncope.       1. Syncope most likely related to acute PE with R heart strain.    2. Acute PE  3. Acute above and below the knee left lower extremity DVT   affecting the femoral and popliteal veins, the posterior tibial peroneal   trunk and the left posterior tibial peroneal and gastrocnemius veins.    - EKG reviewed with EP attending, ST elevations seen on V1-V4. ST elevations are likely secondary to Right heart strain and unlikely Brugada as they are seen through lead V4.   - Would obtain Genetic consult with Dr. Chago Rudolph as patient's mother was  age 40 from unclear sudden circumstance.   - Currently on IV Heparin for PE   - Cardiology follow up for Right heart strain secondary to PE    DORA Howard [facility]  [phone]  53 y.o. Male with HTN, HLD who presents as a transfer from [place] for syncope, found to have bilateral PE with right heart strain.  Patient admits to syncope x 2 episodes the last 2 days both with prodrome of dizziness.  First syncopal episode was while seated at his desk with prodrome of dizziness.  The next day he was leaving work and walking to the car when he had sudden dizziness and loss of consciousness with facial/ dental trauma.  No loss of bowel/ bladder after syncope.  Denies any prior syncope.       1. Syncope most likely related to acute PE with R heart strain.    2. Acute PE  3. Acute above and below the knee left lower extremity DVT   affecting the femoral and popliteal veins, the posterior tibial peroneal   trunk and the left posterior tibial peroneal and gastrocnemius veins.    - EKG reviewed with EP attending, ST elevations seen on V1-V4. ST elevations are likely secondary to Right heart strain and unlikely Brugada as they are seen through lead V4.   - Would obtain Genetic consult with Dr. Chago Rudolph as patient's mother was  age 40 from unclear sudden circumstance.   - Currently on IV Heparin for PE   - Cardiology follow up for Right heart strain secondary to PE  - Maintain K+ >4.0, Mg + >2.0. Supplement today    DORA Howard [facility]  [phone]  53 y.o. Male with HTN, HLD who presents as a transfer from [place] for syncope, found to have bilateral PE with right heart strain.  Patient admits to syncope x 2 episodes the last 2 days both with prodrome of dizziness.  First syncopal episode was while seated at his desk with prodrome of dizziness.  The next day he was leaving work and walking to the car when he had sudden dizziness and loss of consciousness with facial/ dental trauma.  No loss of bowel/ bladder after syncope.  Denies any prior syncope.       1. Syncope most likely related to acute PE with R heart strain.    2. Acute PE  3. Acute above and below the knee left lower extremity DVT   affecting the femoral and popliteal veins, the posterior tibial peroneal   trunk and the left posterior tibial peroneal and gastrocnemius veins.    - EKG reviewed with EP attending, ST elevations seen on V1-V4. ST elevations are likely secondary to Right heart strain and unlikely Brugada as they are seen through lead V4.   - Would obtain Genetic consult with Dr. Chago Rudolph as patient's mother was  age 40 from unclear sudden circumstance.   - Currently on IV Heparin for PE   - Cardiology follow up for Right heart strain secondary to PE- discussed with Medicine ACP   - Maintain K+ >4.0, Mg + >2.0. Supplement today    DORA Howard [facility]  [phone]

## 2023-11-08 NOTE — CONSULT NOTE ADULT - SUBJECTIVE AND OBJECTIVE BOX
Vascular Cardiology Consult Note    DIRECT SERVICE NUMBER:  [phone]           OFFICE [phone]    CC:      HPI:  53M PMHx of HTN, HLD, and chronic venous insufficiency, transferred from [facility] after presenting for syncopal episode and found to have bilateral PE. Pt states the day prior he had felt dizzy and lightheaded briefly before continuing on with his day. Today (11/7) he experienced this same sensation while arising from lying down following a nap. Later in the same day while in the parking lot the pt fainted striking the ground face first. The event was unwitnessed and he quickly recovered on his own without any stigmata of a postictal state such as confusion. He subsequently proceeded to drive home and was told by his aunt to go to the ED. At [facility] he was found to have bilateral large PE in Pulm arteries w/ evidence of R heart strain. They transferred him to our services for possible surgical intervention. No fever, cp, sob, abd pain, n/v. He has no Hx of past syncopal episodes nor any of blood clots or Ca. He does note his aunt told him there is known hx of clotting in his family.     ED: Pt found well appearing, tachycardic but not hypoxic or hypotensive. Started on heparin drip.   He was also seen by cardiology and no immediate surgical intervention was deemed necessary. (07 Nov 2023 23:26)        Allergies    No Known Allergies    Intolerances    	    MEDICATIONS:  heparin  Infusion.  Unit(s)/Hr IV Continuous <Continuous>            atorvastatin 20 milliGRAM(s) Oral at bedtime        PAST MEDICAL & SURGICAL HISTORY:  HTN (hypertension)      HLD (hyperlipidemia)      Lower extremity edema      No significant past surgical history          FAMILY HISTORY:  Family history of clotting disorder (Aunt)        SOCIAL HISTORY:  unchanged    REVIEW OF SYSTEMS:  CONSTITUTIONAL: No fever, weight loss, or fatigue  EYES: No eye pain, visual disturbances, or discharge  ENMT:  No difficulty hearing, tinnitus, vertigo; No sinus or throat pain  NECK: No pain or stiffness  RESPIRATORY: No cough, wheezing, chills or hemoptysis; No Shortness of Breath  CARDIOVASCULAR: No chest pain, palpitations, passing out, dizziness, or leg swelling  GASTROINTESTINAL: No abdominal or epigastric pain. No nausea, vomiting, or hematemesis; No diarrhea or constipation. No melena or hematochezia.  GENITOURINARY: No dysuria, frequency, hematuria, or incontinence  NEUROLOGICAL: No headaches, memory loss, loss of strength, numbness, or tremors  SKIN: No itching, burning, rashes, or lesions   LYMPH Nodes: No enlarged glands  ENDOCRINE: No heat or cold intolerance; No hair loss  MUSCULOSKELETAL: No joint pain or swelling; No muscle, back, or extremity pain  PSYCHIATRIC: No depression, anxiety, mood swings, or difficulty sleeping  HEME/LYMPH: No easy bruising, or bleeding gums  ALLERY AND IMMUNOLOGIC: No hives or eczema		    [ x] All others negative	  [ ] Unable to obtain    PHYSICAL EXAM:  T(C): 36.7 (11-08-23 @ 10:00), Max: 37.3 (11-07-23 @ 19:31)  HR: 126 (11-08-23 @ 10:00) (116 - 140)  BP: 119/83 (11-08-23 @ 10:00) (119/83 - 156/96)  RR: 18 (11-08-23 @ 10:00) (18 - 21)  SpO2: 97% (11-08-23 @ 10:00) (95% - 97%)  Wt(kg): --  I&O's Summary      Appearance:  	  HEENT:   Normal oral mucosa, PERRL, EOMI	  Carotid:  Right: No bruit    Left:  No bruit  Lymphatic: No lymphadenopathy  Cardiovascular: Normal S1 S2, No JVD, No murmurs, No edema  Respiratory: Lungs clear to auscultation	  Psychiatry: A & O x 3, Mood & affect appropriate  Gastrointestinal:  Soft, Non-tender, + BS	  Skin: No rashes, No ecchymoses, No cyanosis	  Neurologic: Non-focal  Extremities: Normal range of motion, No clubbing, cyanosis.  Vascular:   Right Femoral:  Palpable   Left Femoral:  Palpable  Right Popliteal: Palpable   Left Popliteal:  palpable  Right DP:  Palpable             Left DP:  Palpable  Right PT:  Palpable              Left PT:  Palpable    Vascular Pulse Exam:  Right DP: []palpable []non-palpable []audible      Left DP :   []palpable []non-palpable []audible  Right PT: []palpable [] non-palpable []audible   Left PT:  [] palpable [] non-palpable []audible         Foot Exam:        LABS:	 	    CBC Full  -  ( 08 Nov 2023 09:05 )  WBC Count : 8.53 K/uL  Hemoglobin : 12.2 g/dL  Hematocrit : 36.5 %  Platelet Count - Automated : 91 K/uL  Mean Cell Volume : 86.3 fl  Mean Cell Hemoglobin : 28.8 pg  Mean Cell Hemoglobin Concentration : 33.4 gm/dL  Auto Neutrophil # : x  Auto Lymphocyte # : x  Auto Monocyte # : x  Auto Eosinophil # : x  Auto Basophil # : x  Auto Neutrophil % : x  Auto Lymphocyte % : x  Auto Monocyte % : x  Auto Eosinophil % : x  Auto Basophil % : x    11-08    140  |  103  |  8   ----------------------------<  200<H>  3.4<L>   |  22  |  0.90  11-07    139  |  104  |  14  ----------------------------<  150<H>  3.4<L>   |  22  |  1.12    Ca    9.9      08 Nov 2023 09:05  Ca    9.7      07 Nov 2023 20:07  Phos  1.4     11-08  Mg     1.7     11-08  Mg     1.9     11-07    TPro  7.4  /  Alb  4.0  /  TBili  0.8  /  DBili  x   /  AST  64<H>  /  ALT  82<H>  /  AlkPhos  119  11-08  TPro  7.5  /  Alb  4.3  /  TBili  0.4  /  DBili  x   /  AST  157<H>  /  ALT  115<H>  /  AlkPhos  130<H>  11-07          Assessment:  1.            Plan:  1.          Thank you    Kade Cook MD  Cardiology Fellow - PGY6    *** Recommendations are preliminary until cosigned by the attending.    Vascular Cardiology Service  Please call with any questions:   DIRECT SERVICE NUMBER:  [phone]  Office [phone] Vascular Cardiology Consult Note    DIRECT SERVICE NUMBER:  [phone]           OFFICE [phone]    CC: PE    HPI:  54yo M h/o HTN, HLD, chronic venous insufficiency, originally p/t OSH w/ syncope, found to have b/l PE and Brugada pattern on EKG, started on hep gtt, transferred to [facility] for further mgmt.  Pt was USOH until 1d PTA when he developed transient lightheadedness a/w diaphoresis while walking to get coffee, resolved after sitting down. The following day he was walking in a parking lot when he became lightheaded again but then syncopized, falling forward onto his face leading to injury of his face. 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 No tob/illicits, social etoh. No personal or family h/o bleeding/clotting problems, never had stroke/heart attack. No recent long trips or periods of immobility. Has long-standing h/o HTN and venous insufficiency treated w/ ACEi and compression stockings. ROS otherwise neg.    Allergies  No Known Allergies    MEDICATIONS:  heparin  Infusion.  Unit(s)/Hr IV Continuous <Continuous>  atorvastatin 20 milliGRAM(s) Oral at bedtime    PAST MEDICAL & SURGICAL HISTORY:  HTN (hypertension)  HLD (hyperlipidemia)  Lower extremity edema  No significant past surgical history    FAMILY HISTORY:  Family history of clotting disorder (Aunt)    SOCIAL HISTORY:  unchanged    REVIEW OF SYSTEMS:  CONSTITUTIONAL: No fever, weight loss, or fatigue  EYES: No eye pain, visual disturbances, or discharge  ENMT:  No difficulty hearing, tinnitus, vertigo; No sinus or throat pain  NECK: No pain or stiffness  RESPIRATORY: No cough, wheezing, chills or hemoptysis; No Shortness of Breath  CARDIOVASCULAR: No chest pain, palpitations, passing out, dizziness, or leg swelling  GASTROINTESTINAL: No abdominal or epigastric pain. No nausea, vomiting, or hematemesis; No diarrhea or constipation. No melena or hematochezia.  GENITOURINARY: No dysuria, frequency, hematuria, or incontinence  NEUROLOGICAL: No headaches, memory loss, loss of strength, numbness, or tremors  SKIN: No itching, burning, rashes, or lesions   LYMPH Nodes: No enlarged glands  ENDOCRINE: No heat or cold intolerance; No hair loss  MUSCULOSKELETAL: No joint pain or swelling; No muscle, back, or extremity pain  PSYCHIATRIC: No depression, anxiety, mood swings, or difficulty sleeping  HEME/LYMPH: No easy bruising, or bleeding gums  ALLERY AND IMMUNOLOGIC: No hives or eczema		    [ x] All others negative	  [ ] Unable to obtain    PHYSICAL EXAM:  T(C): 36.7 (11-08-23 @ 10:00), Max: 37.3 (11-07-23 @ 19:31)  HR: 126 (11-08-23 @ 10:00) (116 - 140)  BP: 119/83 (11-08-23 @ 10:00) (119/83 - 156/96)  RR: 18 (11-08-23 @ 10:00) (18 - 21)  SpO2: 97% (11-08-23 @ 10:00) (95% - 97%)    Appearance:  	  HEENT:   Normal oral mucosa, PERRL, EOMI	  Carotid:  Right: No bruit    Left:  No bruit  Lymphatic: No lymphadenopathy  Cardiovascular: Normal S1 S2, No JVD, No murmurs, No edema  Respiratory: Lungs clear to auscultation	  Psychiatry: A & O x 3, Mood & affect appropriate  Gastrointestinal:  Soft, Non-tender, + BS	  Skin: No rashes, No ecchymoses, No cyanosis	  Neurologic: Non-focal  Extremities: Normal range of motion, No clubbing, cyanosis., equal and palpable peripheral pulses    LABS:	 	  TTE (11/07/23) -   1. Technically difficult image quality.   2. Left ventricular endocardium is not well visualized; however, the left ventricular systolic function appears grossly normal.   3. Left ventricular cavity is normal. Left ventricular wall thickness is normal. The interventricular septum is flattened in systole consistent with right ventricular pressure overload.   4. Enlarged right ventricular cavity size and reducedsystolic function.   5. Structurally normal mitral valve with normal leaflet excursion. There is trace mitral regurgitation.   6. Estimated pulmonary artery systolic pressure is 66 mmHg, consistent with severe pulmonary hypertension.   7. No prior echocardiogram is available for comparison.    US LE venous duplex b/l (11/08/23) -  - There is acute above and below the knee left lower extremity DVT   affecting the femoral and popliteal veins, the posterior tibial peroneal   trunk and the left posterior tibial peroneal and gastrocnemius veins.  - There are bilaterally thrombosed, varicose great saphenous veins.    CBC Full  -  ( 08 Nov 2023 09:05 )  WBC Count : 8.53 K/uL  Hemoglobin : 12.2 g/dL  Hematocrit : 36.5 %  Platelet Count - Automated : 91 K/uL  Mean Cell Volume : 86.3 fl  Mean Cell Hemoglobin : 28.8 pg  Mean Cell Hemoglobin Concentration : 33.4 gm/dL    140  |  103  |  8   ----------------------------<  200<H>  3.4<L>   |  22  |  0.90  11-07    139  |  104  |  14  ----------------------------<  150<H>  3.4<L>   |  22  |  1.12    Ca    9.9      08 Nov 2023 09:05  Ca    9.7      07 Nov 2023 20:07  Phos  1.4     11-08  Mg     1.7     11-08  Mg     1.9     11-07    TPro  7.4  /  Alb  4.0  /  TBili  0.8  /  DBili  x   /  AST  64<H>  /  ALT  82<H>  /  AlkPhos  119  11-08  TPro  7.5  /  Alb  4.3  /  TBili  0.4  /  DBili  x   /  AST  157<H>  /  ALT  115<H>  /  AlkPhos  130<H>  11-07 Vascular Cardiology Consult Note    DIRECT SERVICE NUMBER:  [phone]           OFFICE [phone]    CC: PE    HPI:  54yo M h/o HTN, HLD, chronic venous insufficiency, originally p/t OSH w/ syncope, found to have b/l PE and Brugada pattern on EKG, started on hep gtt, transferred to [facility] for further mgmt.  Pt was USOH until 1d PTA when he developed transient lightheadedness a/w diaphoresis while walking to get coffee, resolved after sitting down. The following day he was walking in a parking lot when he became lightheaded again but then syncopized, falling forward onto his face leading to injury of his face. 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 No tob/illicits, social etoh. No personal or family h/o bleeding/clotting problems, never had stroke/heart attack. No recent long trips or periods of immobility. Has long-standing h/o HTN and venous insufficiency treated w/ ACEi and compression stockings. Has never had a colonoscopy. ROS otherwise neg.    Allergies  No Known Allergies    MEDICATIONS:  heparin  Infusion.  Unit(s)/Hr IV Continuous <Continuous>  atorvastatin 20 milliGRAM(s) Oral at bedtime    PAST MEDICAL & SURGICAL HISTORY:  HTN (hypertension)  HLD (hyperlipidemia)  Lower extremity edema  No significant past surgical history    FAMILY HISTORY:  Family history of clotting disorder (Aunt)    SOCIAL HISTORY:  unchanged    REVIEW OF SYSTEMS:  CONSTITUTIONAL: No fever, weight loss, or fatigue  EYES: No eye pain, visual disturbances, or discharge  ENMT:  No difficulty hearing, tinnitus, vertigo; No sinus or throat pain  NECK: No pain or stiffness  RESPIRATORY: No cough, wheezing, chills or hemoptysis; No Shortness of Breath  CARDIOVASCULAR: No chest pain, palpitations, passing out, dizziness, or leg swelling  GASTROINTESTINAL: No abdominal or epigastric pain. No nausea, vomiting, or hematemesis; No diarrhea or constipation. No melena or hematochezia.  GENITOURINARY: No dysuria, frequency, hematuria, or incontinence  NEUROLOGICAL: No headaches, memory loss, loss of strength, numbness, or tremors  SKIN: No itching, burning, rashes, or lesions   LYMPH Nodes: No enlarged glands  ENDOCRINE: No heat or cold intolerance; No hair loss  MUSCULOSKELETAL: No joint pain or swelling; No muscle, back, or extremity pain  PSYCHIATRIC: No depression, anxiety, mood swings, or difficulty sleeping  HEME/LYMPH: No easy bruising, or bleeding gums  ALLERY AND IMMUNOLOGIC: No hives or eczema		    [ x] All others negative	  [ ] Unable to obtain    PHYSICAL EXAM:  T(C): 36.7 (11-08-23 @ 10:00), Max: 37.3 (11-07-23 @ 19:31)  HR: 126 (11-08-23 @ 10:00) (116 - 140)  BP: 119/83 (11-08-23 @ 10:00) (119/83 - 156/96)  RR: 18 (11-08-23 @ 10:00) (18 - 21)  SpO2: 97% (11-08-23 @ 10:00) (95% - 97%)    Appearance:  	  HEENT:   Normal oral mucosa, PERRL, EOMI	  Carotid:  Right: No bruit    Left:  No bruit  Lymphatic: No lymphadenopathy  Cardiovascular: Normal S1 S2, No JVD, No murmurs, No edema  Respiratory: Lungs clear to auscultation	  Psychiatry: A & O x 3, Mood & affect appropriate  Gastrointestinal:  Soft, Non-tender, + BS	  Skin: No rashes, No ecchymoses, No cyanosis	  Neurologic: Non-focal  Extremities: Normal range of motion, No clubbing, cyanosis., equal and palpable peripheral pulses    LABS:	 	  TTE (11/07/23) -   1. Technically difficult image quality.   2. Left ventricular endocardium is not well visualized; however, the left ventricular systolic function appears grossly normal.   3. Left ventricular cavity is normal. Left ventricular wall thickness is normal. The interventricular septum is flattened in systole consistent with right ventricular pressure overload.   4. Enlarged right ventricular cavity size and reducedsystolic function.   5. Structurally normal mitral valve with normal leaflet excursion. There is trace mitral regurgitation.   6. Estimated pulmonary artery systolic pressure is 66 mmHg, consistent with severe pulmonary hypertension.   7. No prior echocardiogram is available for comparison.    US LE venous duplex b/l (11/08/23) -  - There is acute above and below the knee left lower extremity DVT   affecting the femoral and popliteal veins, the posterior tibial peroneal   trunk and the left posterior tibial peroneal and gastrocnemius veins.  - There are bilaterally thrombosed, varicose great saphenous veins.    CBC Full  -  ( 08 Nov 2023 09:05 )  WBC Count : 8.53 K/uL  Hemoglobin : 12.2 g/dL  Hematocrit : 36.5 %  Platelet Count - Automated : 91 K/uL  Mean Cell Volume : 86.3 fl  Mean Cell Hemoglobin : 28.8 pg  Mean Cell Hemoglobin Concentration : 33.4 gm/dL    140  |  103  |  8   ----------------------------<  200<H>  3.4<L>   |  22  |  0.90  11-07    139  |  104  |  14  ----------------------------<  150<H>  3.4<L>   |  22  |  1.12    Ca    9.9      08 Nov 2023 09:05  Ca    9.7      07 Nov 2023 20:07  Phos  1.4     11-08  Mg     1.7     11-08  Mg     1.9     11-07    TPro  7.4  /  Alb  4.0  /  TBili  0.8  /  DBili  x   /  AST  64<H>  /  ALT  82<H>  /  AlkPhos  119  11-08  TPro  7.5  /  Alb  4.3  /  TBili  0.4  /  DBili  x   /  AST  157<H>  /  ALT  115<H>  /  AlkPhos  130<H>  11-07

## 2023-11-08 NOTE — CONSULT NOTE ADULT - ASSESSMENT
54yo M h/o HTN, HLD, chronic venous insufficiency, originally p/t OSH w/ syncope, found to have b/l PE and Brugada pattern on EKG, started on hep gtt, transferred to [facility] for further mgmt.    Assessment:  1. acute massive pulmonary embolism  2. acute LLE DVT (above and below knee, femoral and popliteal veins, post. tibial peroneal trunk, post tibial peroneal and gastrocnemius veins  3. HTN  4. abnormal EKG    Plan:  1. hep gtt  2. ***     * CT/PE study/read is in pt's alternative MRN chart    Thank you    Kade Cook MD  Cardiology Fellow - PGY7    *** Recommendations are preliminary until cosigned by the attending.    Vascular Cardiology Service  Please call with any questions:   DIRECT SERVICE NUMBER:  [phone]  Office [phone] 52yo M h/o HTN, HLD, chronic venous insufficiency, originally p/t OSH w/ syncope, found to have b/l PE and Brugada pattern on EKG, started on hep gtt, transferred to [facility] for further mgmt.    Assessment:  1. acute massive pulmonary embolism  2. acute LLE DVT (above and below knee, femoral and popliteal veins, post. tibial peroneal trunk, post tibial peroneal and gastrocnemius veins  3. HTN  4. abnormal EKG    Plan:  1. no plans for acute intervention at this time, please c/t therapeutic anticoagulation, currently on hep gtt  2. please acquire CT venogram of A/P to r/o venous compression  3. age-appropriate cancer screening  4. keep on tele    Will follow     * CT/PE study/read is in pt's alternative MRN chart    Thank you    Kade Cook MD  Cardiology Fellow - PGY7    *** Recommendations are preliminary until cosigned by the attending.    Vascular Cardiology Service  Please call with any questions:   DIRECT SERVICE NUMBER:  [phone]  Office [phone] 52yo M h/o HTN, HLD, chronic venous insufficiency, originally p/t OSH w/ syncope, found to have b/l PE and Brugada pattern on EKG, started on hep gtt, transferred to [facility] for further mgmt.    Assessment:  1. acute intermediate high risk pulmonary embolism  2. acute LLE DVT (above and below knee, femoral and popliteal veins, post. tibial peroneal trunk, post tibial peroneal and gastrocnemius veins  3. HTN  4. abnormal EKG    Plan:  1. no plans for acute intervention at this time, please c/t therapeutic anticoagulation, currently on hep gtt  2. please acquire CT venogram of A/P to r/o venous compression  3. age-appropriate cancer screening  4. keep on tele    Will follow     * CT/PE study/read is in pt's alternative MRN chart    Thank you    Kade Cook MD  Cardiology Fellow - PGY7    *** Recommendations are preliminary until cosigned by the attending.    Vascular Cardiology Service  Please call with any questions:   DIRECT SERVICE NUMBER:  [phone]  Office [phone]

## 2023-11-08 NOTE — CONSULT NOTE ADULT - SUBJECTIVE AND OBJECTIVE BOX
Patient seen and evaluated at bedside    Chief Complaint:    HPI:  52yo M w/HTN, HLD who presents as a transfer from [place] for a PE. Patient was walking to his car from work today when he felt dizzy, he bent down and had a syncopal episode. Regained consciousness quickly, called his aunt who told him to go to the ED.    Presented to [facility], BP 160s/100s, HR 130s, SpO2 97% on RA.  CTPE notable for bilateral PE with R heart strain. Troponin and BNP elevated as well. ECG sinus tach, c/w Brugada pattern Type 1. Transfer call initiated for consideration of interventional approaches and higher level of care. Transferred to [facility] ED for Cardiology evaluation. Started on herpain gtt prior to transfer.    Upon arrival to [facility], tachycardic to 130 but BP remained elevated. Breathing comfortably on RA, satting 97%. Stat TTE with evidence of RV dysfunction, no clot in transit. Patient without symptoms at all, no longer dizzy or lightheaded. Patient denies personal or family h/o SCD, arrhythmias, or clotting (other than an aunt who may have had a blood clot), but has worn compression socks for years because he was told he has "leaky veins." Not up to date on age appropriate cancer screening.      PMHx:   HTN (hypertension)    HLD (hyperlipidemia)    Lower extremity edema        PSHx:   No significant past surgical history        Allergies:  No Known Allergies      Home Meds:  Home Medications:  atorvastatin 20 mg oral tablet: 1 tab(s) orally once a day (07 Nov 2023 23:26)  indapamide 1.25 mg oral tablet: 1 tab(s) orally once a day (07 Nov 2023 23:26)  ramipril 5 mg oral tablet: orally (07 Nov 2023 23:26)      Current Medications:   atorvastatin 20 milliGRAM(s) Oral at bedtime  heparin  Infusion.  Unit(s)/Hr IV Continuous <Continuous>      FAMILY HISTORY:  Family history of clotting disorder (Aunt)        Social History:  Smoking History: no  Alcohol Use: social  Drug Use: no    REVIEW OF SYSTEMS:  All other review of systems is negative unless indicated above.    Physical Exam:  T(F): 98.6 (11-08), Max: 99.2 (11-07)  HR: 128 (11-08) (126 - 140)  BP: 150/107 (11-08) (134/100 - 156/96)  RR: 18 (11-08)  SpO2: 97% (11-08)  GENERAL: No acute distress, well-developed  HEAD:  Atraumatic, Normocephalic  ENT: EOMI, PERRLA, conjunctiva and sclera clear, Neck supple, No JVD, moist mucosa  CHEST/LUNG: Clear to auscultation bilaterally; No wheeze, equal breath sounds bilaterally   BACK: No spinal tenderness  HEART: Tachycardic, regular; No murmurs, rubs, or gallops  ABDOMEN: Soft, Nontender, Nondistended; Bowel sounds present  EXTREMITIES:  No clubbing, cyanosis, or edema  PSYCH: Nl behavior, nl affect  NEUROLOGY: AAOx3, non-focal, cranial nerves intact  SKIN: Normal color, No rashes or lesions  LINES:    Cardiovascular Diagnostic Testing:    ECG: Personally reviewed: sinus tach, Brugada Type 1 pattern     Echo: Personally reviewed:  _______________________________________________________________________________________     CONCLUSIONS:      1. Technically difficult image quality.   2. Left ventricular endocardium is not well visualized; however, the left ventricular systolic function appears grossly normal.   3. Left ventricular cavity is normal. Left ventricular wall thickness is normal. The interventricular septum is flattened in systole consistent with right ventricular pressure overload.   4. Enlarged right ventricular cavity size and reducedsystolic function.   5. Structurally normal mitral valve with normal leaflet excursion. There is trace mitral regurgitation.   6. Estimated pulmonary artery systolic pressure is 66 mmHg, consistent with severe pulmonary hypertension.   7. No prior echocardiogram is available for comparison.    ________________________________________________________________________________________      Stress Testing:    Cath:    Imaging:    CXR: Personally reviewed    Labs: Personally reviewed                        12.2   6.27  )-----------( 93       ( 07 Nov 2023 20:07 )             37.5     11-07    139  |  104  |  14  ----------------------------<  150<H>  3.4<L>   |  22  |  1.12    Ca    9.7      07 Nov 2023 20:07  Mg     1.9     11-07    TPro  7.5  /  Alb  4.3  /  TBili  0.4  /  DBili  x   /  AST  157<H>  /  ALT  115<H>  /  AlkPhos  130<H>  11-07    PT/INR - ( 08 Nov 2023 00:32 )   PT: 12.3 sec;   INR: 1.12 ratio         PTT - ( 08 Nov 2023 00:32 )  PTT:72.8 sec    CARDIAC MARKERS ( 07 Nov 2023 20:07 )  180 ng/L / x     / x     / x     / x     / x

## 2023-11-08 NOTE — CONSULT NOTE ADULT - ASSESSMENT
54yo M w/HTN, HLD who presents as a transfer from [place] for syncope, found to have bilateral PE with right heart strain and Brugada Type 1 pattern on ECG.  Syncope more likely related to acute PE with R heart strain, however possibility of arrhythmogenic syncope secondary to Brugada syndrome cannot be ruled out. Patient with frequent PVCs on telemetry, should be evaluated for an ICD due to concern for Brugada syndrome when acute PE issues are improved.    Recommendations:  - Consider EP study when acute PE issues are resolved 54yo M w/HTN, HLD who presents as a transfer from [place] for syncope, found to have bilateral PE with right heart strain and Brugada Type 1 pattern on ECG.  Syncope more likely related to acute PE with R heart strain, however possibility of arrhythmogenic syncope secondary to Brugada syndrome cannot be ruled out. Patient with frequent PVCs on telemetry, should be evaluated for an ICD due to concern for Brugada syndrome when acute PE issues are improved.    Recommendations:  - Consider EP study vs ICD placement when acute PE issues are resolved  - If ventricular arrhythmias occur prior to above, can consider Quinidine     Case to be staffed with EP attending in AM 52yo M w/HTN, HLD who presents as a transfer from [place] for syncope, found to have bilateral PE with right heart strain and Brugada Type 1 pattern on ECG.  Syncope more likely related to acute PE with R heart strain, however possibility of arrhythmogenic syncope secondary to Brugada syndrome cannot be ruled out. Patient with frequent PVCs on telemetry, should be evaluated for an ICD due to concern for Brugada syndrome when acute PE issues are improved.        Case to be staffed with EP attending in AM

## 2023-11-08 NOTE — PROGRESS NOTE ADULT - ASSESSMENT
53M PMHx HTN, HLD, Chronic Venous Insufficiency, presents for syncopal episode iso bilateral PE. Transferred from [facility] after found to have large bilat PE in Pulm arteries extending to lobar branches seen on CTA with associated right heart strain.

## 2023-11-08 NOTE — PROGRESS NOTE ADULT - PROBLEM SELECTOR PLAN 1
Pt found to have CTA with large bilat PE in pulm art extending into lobar branches; not hypoxic or hypotensive on adm.     ECHO w R heart strain and severe pulm HTN, LE Doppler- acute above and below the knee left lower extremity DVT affecting the femoral and popliteal veins, the posterior tibial peroneal trunk and the left posterior tibial peroneal and gastrocnemius veins. Bilaterally thrombosed, varicose great saphenous veins.    Underlying reason for PE unclear. Pt has had no cancer screening. Will order a PSA and CEA for now.     Hypercoagulable workup- but unlikely to be accurate in the setting of an acute PE. Pt found to have CTA with large bilat PE in pulm art extending into lobar branches; not hypoxic or hypotensive on adm.     Currently on heparin gtt- will transition to Lovenox if Cardiology agreeable.     ECHO w R heart strain and severe pulm HTN, LE Doppler- acute above and below the knee left lower extremity DVT affecting the femoral and popliteal veins, the posterior tibial peroneal trunk and the left posterior tibial peroneal and gastrocnemius veins. Bilaterally thrombosed, varicose great saphenous veins.    Underlying reason for PE unclear. Pt has had no cancer screening. Will order a PSA and CEA for now.     Hypercoagulable workup- but unlikely to be accurate in the setting of an acute PE.

## 2023-11-08 NOTE — PROGRESS NOTE ADULT - PROBLEM SELECTOR PLAN 2
Likely due to PE.    CTH, Cervical spine, and maxillofacial ([facility]): No ICH, or frx. Does have displaced L maxillary incisor tooth- Dental  called.    Seen by EP- ST elevations seen on V1-V4. ST elevations are likely secondary to Right heart strain and unlikely Brugada as they are seen through lead V4. Would obtain Genetic consult with Dr. Chago Rudolph as patient's mother was  age 40 from unclear sudden circumstance. Likely due to PE. CTH, Cervical spine, and maxillofacial ([facility]): No ICH, or frx. Does have displaced L maxillary incisor tooth- Dental  called.    Seen by EP- ST elevations seen on V1-V4. ST elevations are likely secondary to Right heart strain and unlikely Brugada as they are seen through lead V4. Would obtain Genetic consult with Dr. Chago Rudolph as patient's mother was  age 40 from unclear sudden circumstance.

## 2023-11-09 LAB
ANION GAP SERPL CALC-SCNC: 15 MMOL/L — SIGNIFICANT CHANGE UP (ref 5–17)
ANION GAP SERPL CALC-SCNC: 15 MMOL/L — SIGNIFICANT CHANGE UP (ref 5–17)
APTT BLD: 76.2 SEC — HIGH (ref 24.5–35.6)
APTT BLD: 76.2 SEC — HIGH (ref 24.5–35.6)
AT III ACT/NOR PPP CHRO: 75 % — LOW (ref 85–135)
AT III ACT/NOR PPP CHRO: 75 % — LOW (ref 85–135)
AT III AG PPP IA-MCNC: 21 MG/DL — SIGNIFICANT CHANGE UP (ref 19–31)
AT III AG PPP IA-MCNC: 21 MG/DL — SIGNIFICANT CHANGE UP (ref 19–31)
BUN SERPL-MCNC: 8 MG/DL — SIGNIFICANT CHANGE UP (ref 7–23)
BUN SERPL-MCNC: 8 MG/DL — SIGNIFICANT CHANGE UP (ref 7–23)
CALCIUM SERPL-MCNC: 9.4 MG/DL — SIGNIFICANT CHANGE UP (ref 8.4–10.5)
CALCIUM SERPL-MCNC: 9.4 MG/DL — SIGNIFICANT CHANGE UP (ref 8.4–10.5)
CEA SERPL-MCNC: 1.4 NG/ML — SIGNIFICANT CHANGE UP (ref 0–3.8)
CEA SERPL-MCNC: 1.4 NG/ML — SIGNIFICANT CHANGE UP (ref 0–3.8)
CHLORIDE SERPL-SCNC: 103 MMOL/L — SIGNIFICANT CHANGE UP (ref 96–108)
CHLORIDE SERPL-SCNC: 103 MMOL/L — SIGNIFICANT CHANGE UP (ref 96–108)
CO2 SERPL-SCNC: 20 MMOL/L — LOW (ref 22–31)
CO2 SERPL-SCNC: 20 MMOL/L — LOW (ref 22–31)
CREAT SERPL-MCNC: 0.95 MG/DL — SIGNIFICANT CHANGE UP (ref 0.5–1.3)
CREAT SERPL-MCNC: 0.95 MG/DL — SIGNIFICANT CHANGE UP (ref 0.5–1.3)
EGFR: 95 ML/MIN/1.73M2 — SIGNIFICANT CHANGE UP
EGFR: 95 ML/MIN/1.73M2 — SIGNIFICANT CHANGE UP
FACT V ACT/NOR PPP: 98 % — SIGNIFICANT CHANGE UP (ref 50–150)
FACT V ACT/NOR PPP: 98 % — SIGNIFICANT CHANGE UP (ref 50–150)
GLUCOSE SERPL-MCNC: 122 MG/DL — HIGH (ref 70–99)
GLUCOSE SERPL-MCNC: 122 MG/DL — HIGH (ref 70–99)
MAGNESIUM SERPL-MCNC: 1.8 MG/DL — SIGNIFICANT CHANGE UP (ref 1.6–2.6)
MAGNESIUM SERPL-MCNC: 1.8 MG/DL — SIGNIFICANT CHANGE UP (ref 1.6–2.6)
PHOSPHATE SERPL-MCNC: 3.2 MG/DL — SIGNIFICANT CHANGE UP (ref 2.5–4.5)
PHOSPHATE SERPL-MCNC: 3.2 MG/DL — SIGNIFICANT CHANGE UP (ref 2.5–4.5)
POTASSIUM SERPL-MCNC: 3.8 MMOL/L — SIGNIFICANT CHANGE UP (ref 3.5–5.3)
POTASSIUM SERPL-MCNC: 3.8 MMOL/L — SIGNIFICANT CHANGE UP (ref 3.5–5.3)
POTASSIUM SERPL-SCNC: 3.8 MMOL/L — SIGNIFICANT CHANGE UP (ref 3.5–5.3)
POTASSIUM SERPL-SCNC: 3.8 MMOL/L — SIGNIFICANT CHANGE UP (ref 3.5–5.3)
PSA FLD-MCNC: 1.4 NG/ML — SIGNIFICANT CHANGE UP (ref 0–4)
PSA FLD-MCNC: 1.4 NG/ML — SIGNIFICANT CHANGE UP (ref 0–4)
SODIUM SERPL-SCNC: 138 MMOL/L — SIGNIFICANT CHANGE UP (ref 135–145)
SODIUM SERPL-SCNC: 138 MMOL/L — SIGNIFICANT CHANGE UP (ref 135–145)

## 2023-11-09 PROCEDURE — 99233 SBSQ HOSP IP/OBS HIGH 50: CPT

## 2023-11-09 PROCEDURE — 99232 SBSQ HOSP IP/OBS MODERATE 35: CPT

## 2023-11-09 RX ORDER — METOPROLOL TARTRATE 50 MG
12.5 TABLET ORAL
Refills: 0 | Status: DISCONTINUED | OUTPATIENT
Start: 2023-11-09 | End: 2023-11-11

## 2023-11-09 RX ORDER — SALIVA SUBSTITUTE COMB NO.11 351 MG
15 POWDER IN PACKET (EA) MUCOUS MEMBRANE
Refills: 0 | Status: DISCONTINUED | OUTPATIENT
Start: 2023-11-09 | End: 2023-11-11

## 2023-11-09 RX ORDER — APIXABAN 2.5 MG/1
10 TABLET, FILM COATED ORAL EVERY 12 HOURS
Refills: 0 | Status: DISCONTINUED | OUTPATIENT
Start: 2023-11-09 | End: 2023-11-11

## 2023-11-09 RX ORDER — INFLUENZA VIRUS VACCINE 15; 15; 15; 15 UG/.5ML; UG/.5ML; UG/.5ML; UG/.5ML
0.5 SUSPENSION INTRAMUSCULAR ONCE
Refills: 0 | Status: DISCONTINUED | OUTPATIENT
Start: 2023-11-09 | End: 2023-11-11

## 2023-11-09 RX ADMIN — HEPARIN SODIUM 1700 UNIT(S)/HR: 5000 INJECTION INTRAVENOUS; SUBCUTANEOUS at 07:07

## 2023-11-09 RX ADMIN — HEPARIN SODIUM 1700 UNIT(S)/HR: 5000 INJECTION INTRAVENOUS; SUBCUTANEOUS at 15:05

## 2023-11-09 RX ADMIN — HEPARIN SODIUM 1700 UNIT(S)/HR: 5000 INJECTION INTRAVENOUS; SUBCUTANEOUS at 07:39

## 2023-11-09 RX ADMIN — ATORVASTATIN CALCIUM 20 MILLIGRAM(S): 80 TABLET, FILM COATED ORAL at 21:38

## 2023-11-09 RX ADMIN — Medication 12.5 MILLIGRAM(S): at 18:32

## 2023-11-09 RX ADMIN — APIXABAN 10 MILLIGRAM(S): 2.5 TABLET, FILM COATED ORAL at 21:38

## 2023-11-09 NOTE — PROGRESS NOTE ADULT - PROBLEM SELECTOR PLAN 1
Pt found to have CTA with large bilat PE in pulm art extending into lobar branches; not hypoxic or hypotensive on adm.     Currently on heparin gtt.     ECHO w R heart strain and severe pulm HTN, LE Doppler- acute above and below the knee left lower extremity DVT affecting the femoral and popliteal veins, the posterior tibial peroneal trunk and the left posterior tibial peroneal and gastrocnemius veins. Bilaterally thrombosed, varicose great saphenous veins.    Underlying reason for PE unclear. Pt has had no cancer screening. PSA and CEA sent. Outpatient screening Colonoscopy.     Hypercoagulable workup- but unlikely to be accurate in the setting of an acute PE.

## 2023-11-09 NOTE — PROGRESS NOTE ADULT - SUBJECTIVE AND OBJECTIVE BOX
Vascular Cardiology Consult Note    DIRECT SERVICE NUMBER:  [phone]       CC: PE    No events overnight. HR trend improved, BP stable and on RA. Has ambulated to the bathroom without issues. Feels well.     MEDICATIONS:  atorvastatin 20 milliGRAM(s) Oral at bedtime  heparin  Infusion.  Unit(s)/Hr (17 mL/Hr) IV Continuous <Continuous>  metoprolol tartrate 12.5 milliGRAM(s) Oral two times a day    MEDICATIONS  (PRN):    REVIEW OF SYSTEMS:  Per HPI    PHYSICAL EXAM:  Vital Signs Last 24 Hrs  T(C): 37.3 (11-09-23 @ 12:41), Max: 37.3 (11-09-23 @ 09:00)  T(F): 99.1 (11-09-23 @ 12:41), Max: 99.1 (11-09-23 @ 09:00)  HR: 113 (11-09-23 @ 12:41) (108 - 113)  BP: 138/88 (11-09-23 @ 12:41) (133/87 - 138/88)  BP(mean): --  RR: 17 (11-09-23 @ 12:41) (17 - 20)  SpO2: 98% (11-09-23 @ 12:41) (97% - 98%)    Appearance: NAD 	  HEENT:   Normal oral mucosa, PERRL, EOMI	  Carotid:  Right: No bruit    Left:  No bruit  Lymphatic: No lymphadenopathy  Cardiovascular: tachycardic, Normal S1 S2, JVP 10  Respiratory: CTAB  Psychiatry: A & O x 3, Mood & affect appropriate  Gastrointestinal:  Soft, Non-tender, + BS	  Skin: No rashes, No ecchymoses, No cyanosis	  Neurologic: Non-focal  Extremities: palpable pulses, Bilateral lower extremity pitting edema, L>R, motor and sensory intact, wwp, normal capillary refill     LABS:	 	  TTE (11/07/23) -   1. Technically difficult image quality.   2. Left ventricular endocardium is not well visualized; however, the left ventricular systolic function appears grossly normal.   3. Left ventricular cavity is normal. Left ventricular wall thickness is normal. The interventricular septum is flattened in systole consistent with right ventricular pressure overload.   4. Enlarged right ventricular cavity size and reducedsystolic function.   5. Structurally normal mitral valve with normal leaflet excursion. There is trace mitral regurgitation.   6. Estimated pulmonary artery systolic pressure is 66 mmHg, consistent with severe pulmonary hypertension.   7. No prior echocardiogram is available for comparison.    US LE venous duplex b/l (11/08/23) -  - There is acute above and below the knee left lower extremity DVT   affecting the femoral and popliteal veins, the posterior tibial peroneal   trunk and the left posterior tibial peroneal and gastrocnemius veins.  - There are bilaterally thrombosed, varicose great saphenous veins.    Labs reviewed.

## 2023-11-09 NOTE — PROGRESS NOTE ADULT - ASSESSMENT
Intermediate high risk PE, no provoking factor. LLE extensive clot burden. Severe pulmonary hypertension on TTE. Not uptodate on age appropriate cancer screening.     On room air. Tachycardia trend has improved. Hypertensive. Feels well. Ambulating short distances without difficulty.     -therapeutic a/c; on heparin gtt, can transition to full dose Lovenox or DOAC with run-in depending on insurance coverage; patient not interested in additional procedures   -please get CT venogram to eval for May Thurner and IVC clot  -age appropriate cancer screening  -hypercoaguable work-up   -will follow-up with vascular medicine as outpatient; our office will arrange

## 2023-11-09 NOTE — PATIENT PROFILE ADULT - FALL HARM RISK - HARM RISK INTERVENTIONS

## 2023-11-09 NOTE — PROGRESS NOTE ADULT - PROBLEM SELECTOR PLAN 3
Home Ramipril and thiazide (Inapamide) held on admission due to right heart strain. SBP 130s.     Tachy- due to PE. Adding low dose Lopressor, monitor. Home Ramipril and thiazide (Inapamide) held on admission due to right heart strain. SBP 130s.     Tachy- due to PE. Adding low dose Lopressor, monitor.    Glucose elevated on BMP- A1C and FS ordered, monitor.

## 2023-11-09 NOTE — PROGRESS NOTE ADULT - PROBLEM SELECTOR PLAN 2
Likely due to PE. CTH, Cervical spine, and maxillofacial ([facility]): No ICH, or frx. Does have displaced L maxillary incisor tooth- Dental  called.    Seen by EP- ST elevations seen on V1-V4. ST elevations are likely secondary to Right heart strain and unlikely Brugada as they are seen through lead V4. Would obtain Genetic consult with Dr. Chago Rudolph as patient's mother was  age 40 from unclear sudden circumstance- texted Dr Rudolph.

## 2023-11-09 NOTE — PROGRESS NOTE ADULT - SUBJECTIVE AND OBJECTIVE BOX
24H hour events: No over night events.  Resting comfortably in bed without complaints.     MEDICATIONS:  heparin  Infusion.  Unit(s)/Hr IV Continuous <Continuous>  metoprolol tartrate 12.5 milliGRAM(s) Oral two times a day    atorvastatin 20 milliGRAM(s) Oral at bedtime    REVIEW OF SYSTEMS:  Complete 12point ROS negative.    PHYSICAL EXAM:  T(C): 37.3 (11-09-23 @ 12:41), Max: 37.3 (11-09-23 @ 09:00)  HR: 113 (11-09-23 @ 12:41) (108 - 113)  BP: 138/88 (11-09-23 @ 12:41) (133/87 - 138/88)  RR: 17 (11-09-23 @ 12:41) (17 - 20)  SpO2: 98% (11-09-23 @ 12:41) (97% - 98%)    Appearance: Normal, front tooth loss from syncopal episode    HEENT:   Normal oral mucosa, PERRL, EOMI	  Cardiovascular: Normal S1 S2, regular. No JVD, No murmurs, No edema  Respiratory: Lungs clear to auscultation	  Psychiatry: A & O x 3, Mood & affect appropriate  Gastrointestinal:  Soft, Non-tender, + BS	  Skin: No rashes, No ecchymoses, No cyanosis	  Extremities: Normal range of motion, No clubbing, cyanosis or edema  Vascular: Peripheral pulses palpable 2+ bilaterally        LABS:	 	    CBC Full  -  ( 08 Nov 2023 09:05 )  WBC Count : 8.53 K/uL  Hemoglobin : 12.2 g/dL  Hematocrit : 36.5 %  Platelet Count - Automated : 91 K/uL  Mean Cell Volume : 86.3 fl  Mean Cell Hemoglobin : 28.8 pg  Mean Cell Hemoglobin Concentration : 33.4 gm/dL  Auto Neutrophil # : x  Auto Lymphocyte # : x  Auto Monocyte # : x  Auto Eosinophil # : x  Auto Basophil # : x  Auto Neutrophil % : x  Auto Lymphocyte % : x  Auto Monocyte % : x  Auto Eosinophil % : x  Auto Basophil % : x    11-09    138  |  103  |  8   ----------------------------<  122<H>  3.8   |  20<L>  |  0.95  11-08    140  |  103  |  8   ----------------------------<  200<H>  3.4<L>   |  22  |  0.90    Ca    9.4      09 Nov 2023 06:45  Ca    9.9      08 Nov 2023 09:05  Phos  3.2     11-09  Phos  1.4     11-08  Mg     1.8     11-09  Mg     1.7     11-08    TPro  7.4  /  Alb  4.0  /  TBili  0.8  /  DBili  x   /  AST  64<H>  /  ALT  82<H>  /  AlkPhos  119  11-08  TPro  7.5  /  Alb  4.3  /  TBili  0.4  /  DBili  x   /  AST  157<H>  /  ALT  115<H>  /  AlkPhos  130<H>  11-07      TELEMETRY: 	  Sinus Tachycardia 110-113bpm

## 2023-11-09 NOTE — PROGRESS NOTE ADULT - SUBJECTIVE AND OBJECTIVE BOX
Patient is a 53y old  Male who presents with a chief complaint of Pulmonary embolism w/ R heart strain. Dental consulted due to avulsed tooth.       HPI:  53M PMHx of HTN, HLD, and chronic venous insufficiency, transferred from [facility] after presenting for syncopal episode and found to have bilateral PE. Pt states the day prior he had felt dizzy and lightheaded briefly before continuing on with his day. Today (11/7) he experienced this same sensation while arising from lying down following a nap. Later in the same day while in the parking lot the pt fainted striking the ground face first. The event was unwitnessed and he quickly recovered on his own without any stigmata of a postictal state such as confusion. He subsequently proceeded to drive home and was told by his aunt to go to the ED. At [facility] he was found to have bilateral large PE in Pulm arteries w/ evidence of R heart strain. . No fever, cp, sob, abd pain, n/v. He has no Hx of past syncopal episodes nor any of blood clots or Ca. He does note his aunt told him there is known hx of clotting in his family.     Patient described that he attempted to look for his avulsed tooth in the parking lot but was unable to find it at the time.     ED: Pt found well appearing, tachycardic but not hypoxic or hypotensive. Started on heparin drip.   He was also seen by cardiology and no immediate surgical intervention was deemed necessary.       PAST MEDICAL & SURGICAL HISTORY:  HTN (hypertension)      HLD (hyperlipidemia)      Lower extremity edema      No significant past surgical history          MEDICATIONS  (STANDING):  atorvastatin 20 milliGRAM(s) Oral at bedtime  heparin  Infusion.  Unit(s)/Hr (17 mL/Hr) IV Continuous <Continuous>  metoprolol tartrate 12.5 milliGRAM(s) Oral two times a day    MEDICATIONS  (PRN):      Allergies    No Known Allergies      FAMILY HISTORY:  Family history of clotting disorder (Aunt)    EOE:  TMJ ( -  ) clicks                    (  -  ) pops                    (  -  ) crepitus             Mandible FROM             Facial bones and MOM grossly intact             ( -  ) trismus             ( -  ) LAD             ( -  ) swelling             ( -  ) asymmetry             ( -  ) palpation             pt has abrasions on chin area consistent with mechanical fall      IOE:  permanent dentition: grossly intact           labial/buccal mucosa:  Patient has clot #22-27, but patient reports no pain.            (   ) percussion           ( +  ) palpation around area of #9 and #22-27.            ( +  ) swelling-- intraoral lips   Rodriguez Class I fracture on #8. Pt reports no sensitivity on this tooth but that the sharp incisal edge does bother his lip.   #9 completely avulsed. Clot present in socket. Hemostatic.   Mobility:   No mobility noted on teeth.     LABS:                        12.2   8.53  )-----------( 91       ( 08 Nov 2023 09:05 )             36.5     11-09    138  |  103  |  8   ----------------------------<  122<H>  3.8   |  20<L>  |  0.95    Ca    9.4      09 Nov 2023 06:45  Phos  3.2     11-09  Mg     1.8     11-09    TPro  7.4  /  Alb  4.0  /  TBili  0.8  /  DBili  x   /  AST  64<H>  /  ALT  82<H>  /  AlkPhos  119  11-08    WBC Count: 8.53 K/uL [3.80 - 10.50] (11-08 @ 09:05)  Platelet Count - Automated: 91 K/uL *L* [150 - 400] (11-08 @ 09:05)  INR: 1.12 ratio [0.85 - 1.18] (11-08 @ 00:32)  WBC Count: 6.27 K/uL [3.80 - 10.50] (11-07 @ 20:07)  Platelet Count - Automated: 93 K/uL *L* [150 - 400] (11-07 @ 20:07)    DENTAL RADIOGRAPHS:  Pan and PAs taken.  Facial bones intact and Tooth #9 completely avulsed from socket.     ASSESSMENT: No loose dentition present in patients mouth. Rodriguez Class I fracture I. #9 completely avulsed from socket. No alveolar fracture present.     PROCEDURE:  Verbal consent given. IOE, EOE, Pan and PAs taken performed.  Clot present between #22-27 was wiped and hemostasis achieved prior to release of patient. Recommendations discussed with patient. Patient inquired about replacing #9, recommended patient to discuss treatment options with his outside dentist.     RECOMMENDATIONS:  1) Pain meds as per med team  2) CHX rinse 2x/day. Gentle rinsing. Do not spit or utilize straws for next 48 hours as to not dislodge clot in site #9.  3) Follow up with outside dentist for replacement of #9 and comprehensive dental work.        Munira Earl DDS #[id number]

## 2023-11-09 NOTE — PROGRESS NOTE ADULT - SUBJECTIVE AND OBJECTIVE BOX
Patient is a 53y old  Male who presents with a chief complaint of Pulmonary embolism w/ R heart strain (08 Nov 2023 13:38)      SUBJECTIVE / OVERNIGHT EVENTS: Pt up in bed, feels well.     MEDICATIONS  (STANDING):  atorvastatin 20 milliGRAM(s) Oral at bedtime  heparin  Infusion.  Unit(s)/Hr (17 mL/Hr) IV Continuous <Continuous>    MEDICATIONS  (PRN):      CAPILLARY BLOOD GLUCOSE        I&O's Summary      PHYSICAL EXAM:  T(C): 37.3 (11-09-23 @ 09:00), Max: 37.3 (11-09-23 @ 09:00)  HR: 112 (11-09-23 @ 09:00) (108 - 112)  BP: 134/92 (11-09-23 @ 09:00) (133/87 - 137/102)  RR: 18 (11-09-23 @ 09:00) (18 - 20)  SpO2: 98% (11-09-23 @ 09:00) (97% - 98%)  CONSTITUTIONAL: NAD, well-developed, well-groomed  EYES: PERRLA; conjunctiva and sclera clear  ENMT: Missing tooth  NECK: Supple, no palpable masses; no thyromegaly  RESPIRATORY: Normal respiratory effort; lungs are clear to auscultation bilaterally  CARDIOVASCULAR: Regular rate and rhythm, normal S1 and S2, no murmur/rub/gallop; No lower extremity edema; Peripheral pulses are 2+ bilaterally  ABDOMEN: Nontender to palpation, normoactive bowel sounds, no rebound/guarding; No hepatosplenomegaly  MUSCULOSKELETAL:  Normal gait; no clubbing or cyanosis of digits; no joint swelling or tenderness to palpation  PSYCH: A+O to person, place, and time; affect appropriate  NEUROLOGY: CN 2-12 are intact and symmetric; no gross sensory deficits   SKIN: No rashes; no palpable lesions    LABS:                        12.2   8.53  )-----------( 91       ( 08 Nov 2023 09:05 )             36.5     11-09    138  |  103  |  8   ----------------------------<  122<H>  3.8   |  20<L>  |  0.95    Ca    9.4      09 Nov 2023 06:45  Phos  3.2     11-09  Mg     1.8     11-09    TPro  7.4  /  Alb  4.0  /  TBili  0.8  /  DBili  x   /  AST  64<H>  /  ALT  82<H>  /  AlkPhos  119  11-08    PT/INR - ( 08 Nov 2023 00:32 )   PT: 12.3 sec;   INR: 1.12 ratio         PTT - ( 09 Nov 2023 06:45 )  PTT:76.2 sec      Urinalysis Basic - ( 09 Nov 2023 06:45 )    Color: x / Appearance: x / SG: x / pH: x  Gluc: 122 mg/dL / Ketone: x  / Bili: x / Urobili: x   Blood: x / Protein: x / Nitrite: x   Leuk Esterase: x / RBC: x / WBC x   Sq Epi: x / Non Sq Epi: x / Bacteria: x        RADIOLOGY & ADDITIONAL TESTS:    Imaging Personally Reviewed:    Consultant(s) Notes Reviewed:      Care Discussed with Consultants/Other Providers: NP

## 2023-11-09 NOTE — CONSULT NOTE ADULT - SUBJECTIVE AND OBJECTIVE BOX
Pt seen and examined, full consult to follow DATE OF SERVICE: 11-09-23      CHIEF COMPLAINT:Patient is a 53y old  Male who presents with a chief complaint of Pulmonary embolism w/ R heart strain (09 Nov 2023 15:18)      HISTORY OF PRESENT ILLNESS:HPI:  53M PMHx of HTN, HLD, and chronic venous insufficiency, transferred from [facility] after presenting for syncopal episode and found to have bilateral PE. Pt states the day prior he had felt dizzy and lightheaded briefly before continuing on with his day. Today (11/7) he experienced this same sensation while arising from lying down following a nap. Later in the same day while in the parking lot the pt fainted striking the ground face first. The event was unwitnessed and he quickly recovered on his own without any stigmata of a postictal state such as confusion. He subsequently proceeded to drive home and was told by his aunt to go to the ED. At [facility] he was found to have bilateral large PE in Pulm arteries w/ evidence of R heart strain. They transferred him to our services for possible surgical intervention. No fever, cp, sob, abd pain, n/v. He has no Hx of past syncopal episodes nor any of blood clots or Ca. He does note his aunt told him there is known hx of clotting in his family.     ED: Pt found well appearing, tachycardic but not hypoxic or hypotensive. Started on heparin drip.   He was also seen by cardiology and no immediate surgical intervention was deemed necessary. (07 Nov 2023 23:26)      PAST MEDICAL & SURGICAL HISTORY:  HTN (hypertension)      HLD (hyperlipidemia)      Lower extremity edema      No significant past surgical history              MEDICATIONS:  apixaban 10 milliGRAM(s) Oral every 12 hours  metoprolol tartrate 12.5 milliGRAM(s) Oral two times a day            atorvastatin 20 milliGRAM(s) Oral at bedtime    Biotene Dry Mouth Oral Rinse 15 milliLiter(s) Swish and Spit two times a day  influenza   Vaccine 0.5 milliLiter(s) IntraMuscular once      FAMILY HISTORY:  Family history of clotting disorder (Aunt)        Non-contributory    SOCIAL HISTORY:    [ ] not a smoker    Allergies    No Known Allergies    Intolerances    	    REVIEW OF SYSTEMS:  CONSTITUTIONAL: No fever  EYES: No eye pain, visual disturbances, or discharge  ENMT:  No difficulty hearing, tinnitus  NECK: No pain or stiffness  RESPIRATORY: No cough, wheezing,  CARDIOVASCULAR: No chest pain, palpitations, + passing out, dizziness   GASTROINTESTINAL:  No nausea, vomiting, diarrhea or constipation. No melena.  GENITOURINARY: No dysuria, hematuria  NEUROLOGICAL: No stroke like symptoms  SKIN: No burning or lesions   ENDOCRINE: No heat or cold intolerance  MUSCULOSKELETAL: No joint pain or swelling  PSYCHIATRIC: No  anxiety, mood swings  HEME/LYMPH: No bleeding gums  ALLERGY AND IMMUNOLOGIC: No hives or eczema	    All other ROS negative    PHYSICAL EXAM:  T(C): 37.4 (11-09-23 @ 20:30), Max: 37.4 (11-09-23 @ 20:30)  HR: 116 (11-09-23 @ 20:30) (96 - 118)  BP: 140/95 (11-09-23 @ 20:30) (134/92 - 145/98)  RR: 18 (11-09-23 @ 20:30) (17 - 20)  SpO2: 100% (11-09-23 @ 20:30) (98% - 100%)  Wt(kg): --  I&O's Summary      Appearance: Normal	  HEENT:   Normal oral mucosa, EOMI	  Cardiovascular:  S1 S2, No JVD,    Respiratory: Lungs clear to auscultation	  Psychiatry: Alert  Gastrointestinal:  Soft, Non-tender, + BS	  Skin: No rashes   Neurologic: Non-focal  Extremities:  No edema  Vascular: Peripheral pulses palpable    	    	  	  CARDIAC MARKERS:  Labs personally reviewed by me                                  12.2   8.53  )-----------( 91       ( 08 Nov 2023 09:05 )             36.5     11-09    138  |  103  |  8   ----------------------------<  122<H>  3.8   |  20<L>  |  0.95    Ca    9.4      09 Nov 2023 06:45  Phos  3.2     11-09  Mg     1.8     11-09    TPro  7.4  /  Alb  4.0  /  TBili  0.8  /  DBili  x   /  AST  64<H>  /  ALT  82<H>  /  AlkPhos  119  11-08          EKG: Personally reviewed by me -   Radiology: Personally reviewed by me -       Assessment /Plan:       Differential diagnosis and plan of care discussed with patient after the evaluation. Counseling on diet, nutritional counseling, weight management, exercise and medication compliance was done.   Advanced care planning/advanced directives discussed with patient/family. DNR status including forceful chest compressions to attempt to restart the heart, ventilator support/artificial breathing, electric shock, artificial nutrition, health care proxy, Molst form all discussed with pt. Pt wishes to consider. More than fifteen minutes spent on discussing advanced directives.         Didier Alvarez DO [facility]  Cardiovascular Medicine  [address] [place] [address], [address]  Office [phone]  Available via call/text on Microsoft Teams DATE OF SERVICE: 11-09-23      CHIEF COMPLAINT:Patient is a 53y old  Male who presents with a chief complaint of Pulmonary embolism w/ R heart strain (09 Nov 2023 15:18)      HISTORY OF PRESENT ILLNESS:HPI:  53M PMHx of HTN, HLD, and chronic venous insufficiency, transferred from [facility] after presenting for syncopal episode and found to have bilateral PE. Pt states the day prior he had felt dizzy and lightheaded briefly before continuing on with his day. Today (11/7) he experienced this same sensation while arising from lying down following a nap. Later in the same day while in the parking lot the pt fainted striking the ground face first. The event was unwitnessed and he quickly recovered on his own without any stigmata of a postictal state such as confusion. He subsequently proceeded to drive home and was told by his aunt to go to the ED. At [facility] he was found to have bilateral large PE in Pulm arteries w/ evidence of R heart strain. They transferred him to our services for possible surgical intervention. No fever, cp, sob, abd pain, n/v. He has no Hx of past syncopal episodes nor any of blood clots or Ca. He does note his aunt told him there is known hx of clotting in his family.     ED: Pt found well appearing, tachycardic but not hypoxic or hypotensive. Started on heparin drip.   He was also seen by cardiology and no immediate surgical intervention was deemed necessary. (07 Nov 2023 23:26)      PAST MEDICAL & SURGICAL HISTORY:  HTN (hypertension)      HLD (hyperlipidemia)      Lower extremity edema      No significant past surgical history              MEDICATIONS:  apixaban 10 milliGRAM(s) Oral every 12 hours  metoprolol tartrate 12.5 milliGRAM(s) Oral two times a day            atorvastatin 20 milliGRAM(s) Oral at bedtime    Biotene Dry Mouth Oral Rinse 15 milliLiter(s) Swish and Spit two times a day  influenza   Vaccine 0.5 milliLiter(s) IntraMuscular once      FAMILY HISTORY:  Family history of clotting disorder (Aunt)        Non-contributory    SOCIAL HISTORY:    [ ] not a smoker    Allergies    No Known Allergies    Intolerances    	    REVIEW OF SYSTEMS:  CONSTITUTIONAL: No fever  EYES: No eye pain, visual disturbances, or discharge  ENMT:  No difficulty hearing, tinnitus  NECK: No pain or stiffness  RESPIRATORY: No cough, wheezing,  CARDIOVASCULAR: No chest pain, palpitations, + passing out, dizziness   GASTROINTESTINAL:  No nausea, vomiting, diarrhea or constipation. No melena.  GENITOURINARY: No dysuria, hematuria  NEUROLOGICAL: No stroke like symptoms  SKIN: No burning or lesions   ENDOCRINE: No heat or cold intolerance  MUSCULOSKELETAL: No joint pain or swelling  PSYCHIATRIC: No  anxiety, mood swings  HEME/LYMPH: No bleeding gums  ALLERGY AND IMMUNOLOGIC: No hives or eczema	    All other ROS negative    PHYSICAL EXAM:  T(C): 37.4 (11-09-23 @ 20:30), Max: 37.4 (11-09-23 @ 20:30)  HR: 116 (11-09-23 @ 20:30) (96 - 118)  BP: 140/95 (11-09-23 @ 20:30) (134/92 - 145/98)  RR: 18 (11-09-23 @ 20:30) (17 - 20)  SpO2: 100% (11-09-23 @ 20:30) (98% - 100%)  Wt(kg): --  I&O's Summary      Appearance: Normal	  HEENT:   Normal oral mucosa, EOMI	  Cardiovascular:  S1 S2, No JVD,    Respiratory: Lungs clear to auscultation	  Psychiatry: Alert  Gastrointestinal:  Soft, Non-tender, + BS	  Skin: No rashes   Neurologic: Non-focal  Extremities:  No edema  Vascular: Peripheral pulses palpable    	    	  	  CARDIAC MARKERS:  Labs personally reviewed by me                                  12.2   8.53  )-----------( 91       ( 08 Nov 2023 09:05 )             36.5     11-09    138  |  103  |  8   ----------------------------<  122<H>  3.8   |  20<L>  |  0.95    Ca    9.4      09 Nov 2023 06:45  Phos  3.2     11-09  Mg     1.8     11-09    TPro  7.4  /  Alb  4.0  /  TBili  0.8  /  DBili  x   /  AST  64<H>  /  ALT  82<H>  /  AlkPhos  119  11-08          EKG: Personally reviewed by me - NSR, not consistent with brugada as per EP  Radiology: Personally reviewed by me - CXR clear lungs      Assessment and Plan: 	  53M PMHx HTN, HLD, Chronic Venous Insufficiency, presents for syncopal episode iso bilateral PE. Transferred from [facility] after found to have large bilat PE in Pulm arteries extending to lobar branches seen on CTA with associated right heart strain.       Problem/Plan - 1:  ·  Problem: Pulmonary embolism.   ·  Plan: Large bilat PE in pulm art extending into lobar branches;   -  Doppler- acute above and below the knee left lower extremity DVT affecting the femoral and popliteal veins, the posterior tibial peroneal trunk and the left posterior tibial peroneal and gastrocnemius veins. Bilaterally thrombosed, varicose great saphenous veins.  - not hypoxic or hypotensive, comfortable on RA  - TTE w R heart strain and severe pulm HTN,  - Pt declines procedures, vascular interventional cards recs appreciated  - Mild compensatory physiologic sinus tach  - Transition to Eliquis 10mg BID    Problem/Plan - 2:  ·  Problem: Syncope.   ·  Plan: Likely due to PE. C   - no further work up     Problem/Plan - 3:  ·  Problem: HTN (hypertension).   ·  Plan: Home Ramipril and thiazide (Inapamide) held on admission due to right heart strain. SBP 130s.     Problem/Plan - 4:  ·  Problem: HLD (hyperlipidemia).   ·  Plan: Home atorvastatin.    Problem/Plan - 5:  ·  Problem: Abnormal EKG  ·  Plan: EP recs appreciated ST elevations are likely secondary to Right heart strain and unlikely Brugada   - Genetic consult with Dr. Chago Rudolph as outpatient                Differential diagnosis and plan of care discussed with patient after the evaluation. Counseling on diet, nutritional counseling, weight management, exercise and medication compliance was done.   Advanced care planning/advanced directives discussed with patient/family. DNR status including forceful chest compressions to attempt to restart the heart, ventilator support/artificial breathing, electric shock, artificial nutrition, health care proxy, Molst form all discussed with pt. Pt wishes to consider. More than fifteen minutes spent on discussing advanced directives.         Didier Alvarez DO [facility]  Cardiovascular Medicine  [address] [place] [address], [address]  Office [phone]  Available via call/text on Microsoft Teams

## 2023-11-09 NOTE — PROGRESS NOTE ADULT - ASSESSMENT
53 y.o. Male with HTN, HLD who presents as a transfer from [place] for syncope, found to have bilateral PE with right heart strain.  Patient admits to syncope x 2 episodes the last 2 days both with prodrome of dizziness.  First syncopal episode was while seated at his desk with prodrome of dizziness.  The next day he was leaving work and walking to the car when he had sudden dizziness and loss of consciousness with facial/ dental trauma.  No loss of bowel/ bladder after syncope.  Denies any prior syncope.     1. Syncope most likely related to acute PE with R heart strain.    2. Acute PE  3. Acute above and below the knee left lower extremity DVT   affecting the femoral and popliteal veins, the posterior tibial peroneal   trunk and the left posterior tibial peroneal and gastrocnemius veins.    - EKG reviewed with EP attending, ST elevations seen on V1-V4. ST elevations are likely secondary to Right heart strain and unlikely Brugada as they are seen through lead V4.   - Would obtain Genetic consult with Dr. Chago Rudolph as patient's mother was  age 40 from unclear sudden circumstance.   - Currently on IV Heparin for PE  - Cardiology follow up for Right heart strain secondary to PE- discussed with Medicine ACP   - Maintain K+ >4.0, Mg + >2.0. Supplement today      DORA Howard [facility]  [phone]  53 y.o. Male with HTN, HLD who presents as a transfer from [place] for syncope, found to have bilateral PE with right heart strain.  Patient admits to syncope x 2 episodes the last 2 days both with prodrome of dizziness.  First syncopal episode was while seated at his desk with prodrome of dizziness.  The next day he was leaving work and walking to the car when he had sudden dizziness and loss of consciousness with facial/ dental trauma.  No loss of bowel/ bladder after syncope.  Denies any prior syncope. Out patient EKG reviewed with Dr. Rivera, no Brugada noted on that EKG.     1. Syncope most likely related to acute PE with R heart strain.    2. Acute PE  3. Acute above and below the knee left lower extremity DVT   affecting the femoral and popliteal veins, the posterior tibial peroneal   trunk and the left posterior tibial peroneal and gastrocnemius veins.    - EKG reviewed with EP attending, ST elevations seen on V1-V4. ST elevations are likely secondary to Right heart strain and unlikely Brugada as they are seen through lead V4.   - Would obtain Genetic consult with Dr. Chago Rudolph as patient's mother was  age 40 from unclear sudden circumstance.   - Currently on IV Heparin for PE transition to oral AC  - Cardiology follow up for Right heart strain secondary to PE- discussed with Medicine ACP   - Maintain K+ >4.0, Mg + >2.0. Supplement today  - EP will sign off, reconsult as needed       DORA Howard [facility]  [phone]

## 2023-11-09 NOTE — PROGRESS NOTE ADULT - ASSESSMENT
53M PMHx HTN, HLD, Chronic Venous Insufficiency, presents for syncopal episode iso bilateral PE. Transferred from [facility] after found to have large bilat PE in Pulm arteries extending to lobar branches seen on CTA with associated right heart strain.

## 2023-11-10 ENCOUNTER — TRANSCRIPTION ENCOUNTER (OUTPATIENT)
Age: 53
End: 2023-11-10

## 2023-11-10 LAB
A1C WITH ESTIMATED AVERAGE GLUCOSE RESULT: 5.1 % — SIGNIFICANT CHANGE UP (ref 4–5.6)
A1C WITH ESTIMATED AVERAGE GLUCOSE RESULT: 5.1 % — SIGNIFICANT CHANGE UP (ref 4–5.6)
ANION GAP SERPL CALC-SCNC: 14 MMOL/L — SIGNIFICANT CHANGE UP (ref 5–17)
ANION GAP SERPL CALC-SCNC: 14 MMOL/L — SIGNIFICANT CHANGE UP (ref 5–17)
APTT BLD: 29.8 SEC — SIGNIFICANT CHANGE UP (ref 24.5–35.6)
APTT BLD: 29.8 SEC — SIGNIFICANT CHANGE UP (ref 24.5–35.6)
BUN SERPL-MCNC: 10 MG/DL — SIGNIFICANT CHANGE UP (ref 7–23)
BUN SERPL-MCNC: 10 MG/DL — SIGNIFICANT CHANGE UP (ref 7–23)
CALCIUM SERPL-MCNC: 9.9 MG/DL — SIGNIFICANT CHANGE UP (ref 8.4–10.5)
CALCIUM SERPL-MCNC: 9.9 MG/DL — SIGNIFICANT CHANGE UP (ref 8.4–10.5)
CHLORIDE SERPL-SCNC: 101 MMOL/L — SIGNIFICANT CHANGE UP (ref 96–108)
CHLORIDE SERPL-SCNC: 101 MMOL/L — SIGNIFICANT CHANGE UP (ref 96–108)
CHOLEST SERPL-MCNC: 150 MG/DL — SIGNIFICANT CHANGE UP
CHOLEST SERPL-MCNC: 150 MG/DL — SIGNIFICANT CHANGE UP
CO2 SERPL-SCNC: 21 MMOL/L — LOW (ref 22–31)
CO2 SERPL-SCNC: 21 MMOL/L — LOW (ref 22–31)
CREAT SERPL-MCNC: 0.99 MG/DL — SIGNIFICANT CHANGE UP (ref 0.5–1.3)
CREAT SERPL-MCNC: 0.99 MG/DL — SIGNIFICANT CHANGE UP (ref 0.5–1.3)
EGFR: 91 ML/MIN/1.73M2 — SIGNIFICANT CHANGE UP
EGFR: 91 ML/MIN/1.73M2 — SIGNIFICANT CHANGE UP
ESTIMATED AVERAGE GLUCOSE: 100 MG/DL — SIGNIFICANT CHANGE UP (ref 68–114)
ESTIMATED AVERAGE GLUCOSE: 100 MG/DL — SIGNIFICANT CHANGE UP (ref 68–114)
GLUCOSE BLDC GLUCOMTR-MCNC: 99 MG/DL — SIGNIFICANT CHANGE UP (ref 70–99)
GLUCOSE BLDC GLUCOMTR-MCNC: 99 MG/DL — SIGNIFICANT CHANGE UP (ref 70–99)
GLUCOSE SERPL-MCNC: 109 MG/DL — HIGH (ref 70–99)
GLUCOSE SERPL-MCNC: 109 MG/DL — HIGH (ref 70–99)
HCT VFR BLD CALC: 34.6 % — LOW (ref 39–50)
HCT VFR BLD CALC: 34.6 % — LOW (ref 39–50)
HDLC SERPL-MCNC: 73 MG/DL — SIGNIFICANT CHANGE UP
HDLC SERPL-MCNC: 73 MG/DL — SIGNIFICANT CHANGE UP
HGB BLD-MCNC: 11.6 G/DL — LOW (ref 13–17)
HGB BLD-MCNC: 11.6 G/DL — LOW (ref 13–17)
LIPID PNL WITH DIRECT LDL SERPL: 61 MG/DL — SIGNIFICANT CHANGE UP
LIPID PNL WITH DIRECT LDL SERPL: 61 MG/DL — SIGNIFICANT CHANGE UP
MCHC RBC-ENTMCNC: 29 PG — SIGNIFICANT CHANGE UP (ref 27–34)
MCHC RBC-ENTMCNC: 29 PG — SIGNIFICANT CHANGE UP (ref 27–34)
MCHC RBC-ENTMCNC: 33.5 GM/DL — SIGNIFICANT CHANGE UP (ref 32–36)
MCHC RBC-ENTMCNC: 33.5 GM/DL — SIGNIFICANT CHANGE UP (ref 32–36)
MCV RBC AUTO: 86.5 FL — SIGNIFICANT CHANGE UP (ref 80–100)
MCV RBC AUTO: 86.5 FL — SIGNIFICANT CHANGE UP (ref 80–100)
NON HDL CHOLESTEROL: 78 MG/DL — SIGNIFICANT CHANGE UP
NON HDL CHOLESTEROL: 78 MG/DL — SIGNIFICANT CHANGE UP
NRBC # BLD: 0 /100 WBCS — SIGNIFICANT CHANGE UP (ref 0–0)
NRBC # BLD: 0 /100 WBCS — SIGNIFICANT CHANGE UP (ref 0–0)
PLATELET # BLD AUTO: 102 K/UL — LOW (ref 150–400)
PLATELET # BLD AUTO: 102 K/UL — LOW (ref 150–400)
POTASSIUM SERPL-MCNC: 3.8 MMOL/L — SIGNIFICANT CHANGE UP (ref 3.5–5.3)
POTASSIUM SERPL-MCNC: 3.8 MMOL/L — SIGNIFICANT CHANGE UP (ref 3.5–5.3)
POTASSIUM SERPL-SCNC: 3.8 MMOL/L — SIGNIFICANT CHANGE UP (ref 3.5–5.3)
POTASSIUM SERPL-SCNC: 3.8 MMOL/L — SIGNIFICANT CHANGE UP (ref 3.5–5.3)
RBC # BLD: 4 M/UL — LOW (ref 4.2–5.8)
RBC # BLD: 4 M/UL — LOW (ref 4.2–5.8)
RBC # FLD: 13.2 % — SIGNIFICANT CHANGE UP (ref 10.3–14.5)
RBC # FLD: 13.2 % — SIGNIFICANT CHANGE UP (ref 10.3–14.5)
SODIUM SERPL-SCNC: 136 MMOL/L — SIGNIFICANT CHANGE UP (ref 135–145)
SODIUM SERPL-SCNC: 136 MMOL/L — SIGNIFICANT CHANGE UP (ref 135–145)
TRIGL SERPL-MCNC: 90 MG/DL — SIGNIFICANT CHANGE UP
TRIGL SERPL-MCNC: 90 MG/DL — SIGNIFICANT CHANGE UP
WBC # BLD: 6.35 K/UL — SIGNIFICANT CHANGE UP (ref 3.8–10.5)
WBC # BLD: 6.35 K/UL — SIGNIFICANT CHANGE UP (ref 3.8–10.5)
WBC # FLD AUTO: 6.35 K/UL — SIGNIFICANT CHANGE UP (ref 3.8–10.5)
WBC # FLD AUTO: 6.35 K/UL — SIGNIFICANT CHANGE UP (ref 3.8–10.5)

## 2023-11-10 PROCEDURE — 99232 SBSQ HOSP IP/OBS MODERATE 35: CPT

## 2023-11-10 PROCEDURE — 99254 IP/OBS CNSLTJ NEW/EST MOD 60: CPT | Mod: GC

## 2023-11-10 PROCEDURE — 74177 CT ABD & PELVIS W/CONTRAST: CPT | Mod: 26

## 2023-11-10 PROCEDURE — 99233 SBSQ HOSP IP/OBS HIGH 50: CPT

## 2023-11-10 RX ORDER — CHLORHEXIDINE GLUCONATE 213 G/1000ML
1 SOLUTION TOPICAL
Refills: 0 | Status: DISCONTINUED | OUTPATIENT
Start: 2023-11-10 | End: 2023-11-11

## 2023-11-10 RX ORDER — MAGNESIUM SULFATE 500 MG/ML
2 VIAL (ML) INJECTION ONCE
Refills: 0 | Status: COMPLETED | OUTPATIENT
Start: 2023-11-10 | End: 2023-11-10

## 2023-11-10 RX ORDER — APIXABAN 2.5 MG/1
2 TABLET, FILM COATED ORAL
Qty: 74 | Refills: 0
Start: 2023-11-10

## 2023-11-10 RX ADMIN — Medication 15 MILLILITER(S): at 18:07

## 2023-11-10 RX ADMIN — APIXABAN 10 MILLIGRAM(S): 2.5 TABLET, FILM COATED ORAL at 18:06

## 2023-11-10 RX ADMIN — CHLORHEXIDINE GLUCONATE 1 APPLICATION(S): 213 SOLUTION TOPICAL at 18:07

## 2023-11-10 RX ADMIN — Medication 12.5 MILLIGRAM(S): at 05:13

## 2023-11-10 RX ADMIN — Medication 12.5 MILLIGRAM(S): at 18:07

## 2023-11-10 RX ADMIN — APIXABAN 10 MILLIGRAM(S): 2.5 TABLET, FILM COATED ORAL at 05:13

## 2023-11-10 RX ADMIN — ATORVASTATIN CALCIUM 20 MILLIGRAM(S): 80 TABLET, FILM COATED ORAL at 22:04

## 2023-11-10 RX ADMIN — Medication 25 GRAM(S): at 05:13

## 2023-11-10 NOTE — PROGRESS NOTE ADULT - SUBJECTIVE AND OBJECTIVE BOX
Vascular Cardiology Consult Note    DIRECT SERVICE NUMBER:  [phone]       CC: PE    No events overnight. HR trend improved. Has ambulated without issue.   CT venogram no iliocaval clot. No obvious masses, albeit gated for venous phase.  On Apixaban 10mg    MEDICATIONS  (STANDING):  apixaban 10 milliGRAM(s) Oral every 12 hours  atorvastatin 20 milliGRAM(s) Oral at bedtime  Biotene Dry Mouth Oral Rinse 15 milliLiter(s) Swish and Spit two times a day  chlorhexidine 2% Cloths 1 Application(s) Topical <User Schedule>  influenza   Vaccine 0.5 milliLiter(s) IntraMuscular once  metoprolol tartrate 12.5 milliGRAM(s) Oral two times a day    MEDICATIONS  (PRN):      REVIEW OF SYSTEMS:  Per HPI    PHYSICAL EXAM:  ICU Vital Signs Last 24 Hrs  T(C): 37.3 (10 Nov 2023 11:12), Max: 37.4 (09 Nov 2023 20:30)  T(F): 99.2 (10 Nov 2023 11:12), Max: 99.3 (09 Nov 2023 20:30)  HR: 112 (10 Nov 2023 11:12) (96 - 118)  BP: 146/99 (10 Nov 2023 11:12) (130/93 - 159/97)  BP(mean): --  ABP: --  ABP(mean): --  RR: 18 (10 Nov 2023 11:12) (18 - 18)  SpO2: 99% (10 Nov 2023 11:12) (97% - 100%)    O2 Parameters below as of 10 Nov 2023 11:12  Patient On (Oxygen Delivery Method): room air    Appearance: NAD 	  HEENT:   Normal oral mucosa, PERRL, EOMI	  Carotid:  Right: No bruit    Left:  No bruit  Lymphatic: No lymphadenopathy  Cardiovascular: tachycardic, Normal S1 S2, JVP 10  Respiratory: CTAB  Psychiatry: A & O x 3, Mood & affect appropriate  Gastrointestinal:  Soft, Non-tender, + BS	  Skin: No rashes, No ecchymoses, No cyanosis	  Neurologic: Non-focal  Extremities: palpable pulses, Bilateral lower extremity pitting edema, L>R, motor and sensory intact, wwp, normal capillary refill     Studies:	 	  TTE (11/07/23) -   1. Technically difficult image quality.   2. Left ventricular endocardium is not well visualized; however, the left ventricular systolic function appears grossly normal.   3. Left ventricular cavity is normal. Left ventricular wall thickness is normal. The interventricular septum is flattened in systole consistent with right ventricular pressure overload.   4. Enlarged right ventricular cavity size and reducedsystolic function.   5. Structurally normal mitral valve with normal leaflet excursion. There is trace mitral regurgitation.   6. Estimated pulmonary artery systolic pressure is 66 mmHg, consistent with severe pulmonary hypertension.   7. No prior echocardiogram is available for comparison.    US LE venous duplex b/l (11/08/23) -  - There is acute above and below the knee left lower extremity DVT   affecting the femoral and popliteal veins, the posterior tibial peroneal   trunk and the left posterior tibial peroneal and gastrocnemius veins.  - There are bilaterally thrombosed, varicose great saphenous veins.    CT venogram, 11/10/23:  LOWER CHEST: Pulmonary artery filling defects within right lower lobe are artery and left lower lobe segmental arteries most in keeping with pulmonary emboli.  Subcentimeter calcified left hilar and left lower lobar lymph nodes. Mild bibasilar subsegmental atelectasis.    LIVER: Calcified granulomata. Mild focal fat deposition adjacent to the falciform ligament.  BILE DUCTS: Normal caliber.  GALLBLADDER: Nondistended  SPLEEN: Within normal size limits. Calcified granulomata  PANCREAS: Within normal limits.  ADRENALS: Within normal limits.  KIDNEYS/URETERS: Symmetric renal enhancement without hydronephrosis.  Subcentimeter right upper pole hypodensity too small for definitive characterization    BLADDER: Within normal limits.  REPRODUCTIVE ORGANS: Prostate within normal size limits.    BOWEL: No bowel obstruction. Appendix is normal.  PERITONEUM: No ascites.  VESSELS: Small volume of eccentric linear calcification within right internal iliac vein. No acute pelvic deep venous or caval thrombosis. No iliac vein compression  RETROPERITONEUM/LYMPH NODES: No lymphadenopathy.  ABDOMINAL WALL: No significant acute abnormality.  BONES: Multilevel degenerative changes throughout the spine.      Labs reviewed.

## 2023-11-10 NOTE — PROGRESS NOTE ADULT - SUBJECTIVE AND OBJECTIVE BOX
[facility] Division of Hospital Medicine  Radha Han MD  Available via MS Teams      SUBJECTIVE / OVERNIGHT EVENTS: No acute events overnight. Pt feels well. No acute symptoms of chest pain shortness of breath, headache     ADDITIONAL REVIEW OF SYSTEMS: ROS reviewed and found to be negative     MEDICATIONS  (STANDING):  apixaban 10 milliGRAM(s) Oral every 12 hours  atorvastatin 20 milliGRAM(s) Oral at bedtime  Biotene Dry Mouth Oral Rinse 15 milliLiter(s) Swish and Spit two times a day  chlorhexidine 2% Cloths 1 Application(s) Topical <User Schedule>  influenza   Vaccine 0.5 milliLiter(s) IntraMuscular once  metoprolol tartrate 12.5 milliGRAM(s) Oral two times a day    MEDICATIONS  (PRN):      I&O's Summary    09 Nov 2023 07:01  -  10 Nov 2023 07:00  --------------------------------------------------------  IN: 0 mL / OUT: 500 mL / NET: -500 mL    10 Nov 2023 07:01  -  10 Nov 2023 14:30  --------------------------------------------------------  IN: 480 mL / OUT: 0 mL / NET: 480 mL        PHYSICAL EXAM:  Vital Signs Last 24 Hrs  T(C): 37.3 (10 Nov 2023 11:12), Max: 37.4 (09 Nov 2023 20:30)  T(F): 99.2 (10 Nov 2023 11:12), Max: 99.3 (09 Nov 2023 20:30)  HR: 112 (10 Nov 2023 11:12) (96 - 118)  BP: 146/99 (10 Nov 2023 11:12) (130/93 - 159/97)  BP(mean): --  RR: 18 (10 Nov 2023 11:12) (18 - 18)  SpO2: 99% (10 Nov 2023 11:12) (97% - 100%)    Parameters below as of 10 Nov 2023 11:12  Patient On (Oxygen Delivery Method): room air      CONSTITUTIONAL: NAD, well-developed, well-groomed  EYES: PERRLA; conjunctiva and sclera clear  ENMT: Missing tooth front left  incisor   NECK: Supple, no palpable masses; no thyromegaly  RESPIRATORY: Normal respiratory effort; lungs are clear to auscultation bilaterally  CARDIOVASCULAR: Regular rate and rhythm, normal S1 and S2,   ABDOMEN: Nontender to palpation, normoactive bowel sounds  MUSCULOSKELETAL:  Normal gait; no joint swelling or tenderness to palpation  PSYCH: A+O to person, place, and time; affect appropriate  NEUROLOGY: No gross sensory deficits   SKIN: No rashes    LABS:                        11.6   6.35  )-----------( 102      ( 10 Nov 2023 06:16 )             34.6     11-10    136  |  101  |  10  ----------------------------<  109<H>  3.8   |  21<L>  |  0.99    Ca    9.9      10 Nov 2023 06:12  Phos  3.2     11-09  Mg     1.8     11-09      PTT - ( 10 Nov 2023 06:16 )  PTT:29.8 sec

## 2023-11-10 NOTE — DISCHARGE NOTE PROVIDER - HOSPITAL COURSE
HPI:  53M PMHx of HTN, HLD, and chronic venous insufficiency, transferred from [facility] after presenting for syncopal episode and found to have bilateral PE. Pt states the day prior he had felt dizzy and lightheaded briefly before continuing on with his day. Today (11/7) he experienced this same sensation while arising from lying down following a nap. Later in the same day while in the parking lot the pt fainted striking the ground face first. The event was unwitnessed and he quickly recovered on his own without any stigmata of a postictal state such as confusion. He subsequently proceeded to drive home and was told by his aunt to go to the ED. At [facility] he was found to have bilateral large PE in Pulm arteries w/ evidence of R heart strain. They transferred him to our services for possible surgical intervention. No fever, cp, sob, abd pain, n/v. He has no Hx of past syncopal episodes nor any of blood clots or Ca. He does note his aunt told him there is known hx of clotting in his family.     ED: Pt found well appearing, tachycardic but not hypoxic or hypotensive. Started on heparin drip.   He was also seen by cardiology and no immediate surgical intervention was deemed necessary. (07 Nov 2023 23:26)    Hospital Course:  Patient was transferred from [facility] after found to have large bilateral PE in Pulm arteries extending to lobar branches seen on CTA with associated right heart strain after a syncopal event. Patient was not hypoxic or hypotensive on admission.  Echocardiogram consisted with right heart strain and severe pulmonary hypertension. LE Doppler with acute above and below the knee left lower extremity DVT affecting the femoral and popliteal veins, the posterior tibial peroneal trunk and the left posterior tibial peroneal and gastrocnemius veins. Bilaterally thrombosed, varicose great saphenous veins. Maintained on heparin gtt during admission. Underlying reason for PE remains unclear, has had no cancer screenings and PSA/CEA remain within normal limits. Refer for outpatient colonoscopy. Hematology consulted, hypercoagulable workup in progress. Vascular cardiology consulted as patient remained tachycardiac on telemetry, likely related to PE. No additional interventions were recommended, patient will follow up with their office outpatient.     Important Medication Changes and Reason:    Active or Pending Issues Requiring Follow-up:  Follow up with vascular cardiology outpatient.  Follow up with hematology outpatient to complete hypercoagulable workup.    Advanced Directives:   [x] Full code  [ ] DNR  [ ] Hospice    Discharge Diagnoses:  Large bilateral Pulmonary embolism with right heart strain  Left above and below knee DVT  Hypertension       HPI:  53M PMHx of HTN, HLD, and chronic venous insufficiency, transferred from [facility] after presenting for syncopal episode and found to have bilateral PE. Pt states the day prior he had felt dizzy and lightheaded briefly before continuing on with his day. Today (11/7) he experienced this same sensation while arising from lying down following a nap. Later in the same day while in the parking lot the pt fainted striking the ground face first. The event was unwitnessed and he quickly recovered on his own without any stigmata of a postictal state such as confusion. He subsequently proceeded to drive home and was told by his aunt to go to the ED. At [facility] he was found to have bilateral large PE in Pulm arteries w/ evidence of R heart strain. They transferred him to our services for possible surgical intervention. No fever, cp, sob, abd pain, n/v. He has no Hx of past syncopal episodes nor any of blood clots or Ca. He does note his aunt told him there is known hx of clotting in his family.     ED: Pt found well appearing, tachycardic but not hypoxic or hypotensive. Started on heparin drip.   He was also seen by cardiology and no immediate surgical intervention was deemed necessary. (07 Nov 2023 23:26)    Hospital Course:  Patient was transferred from [facility] after found to have large bilateral PE in Pulm arteries extending to lobar branches seen on CTA with associated right heart strain after a syncopal event. Patient was not hypoxic or hypotensive on admission.  Echocardiogram consisted with right heart strain and severe pulmonary hypertension. LE Doppler with acute above and below the knee left lower extremity DVT affecting the femoral and popliteal veins, the posterior tibial peroneal trunk and the left posterior tibial peroneal and gastrocnemius veins. Bilaterally thrombosed, varicose great saphenous veins. Maintained on heparin gtt during admission. Underlying reason for PE remains unclear, has had no cancer screenings and PSA/CEA remain within normal limits. Refer for outpatient colonoscopy. Hematology consulted, hypercoagulable workup in progress. Vascular cardiology consulted as patient remained tachycardiac on telemetry, likely related to PE. No additional interventions were recommended, patient will follow up with their office outpatient. Follow up with hematology outpatient.    Important Medication Changes and Reason:    Active or Pending Issues Requiring Follow-up:  Follow up with vascular cardiology outpatient.  Follow up with hematology outpatient to complete hypercoagulable workup.    Advanced Directives:   [x] Full code  [ ] DNR  [ ] Hospice    Discharge Diagnoses:  Large bilateral Pulmonary embolism with right heart strain  Left above and below knee DVT  Hypertension       HPI:  53M PMHx of HTN, HLD, and chronic venous insufficiency, transferred from [facility] after presenting for syncopal episode and found to have bilateral PE. Pt states the day prior he had felt dizzy and lightheaded briefly before continuing on with his day. Today (11/7) he experienced this same sensation while arising from lying down following a nap. Later in the same day while in the parking lot the pt fainted striking the ground face first. The event was unwitnessed and he quickly recovered on his own without any stigmata of a postictal state such as confusion. He subsequently proceeded to drive home and was told by his aunt to go to the ED. At [facility] he was found to have bilateral large PE in Pulm arteries w/ evidence of R heart strain. They transferred him to our services for possible surgical intervention. No fever, cp, sob, abd pain, n/v. He has no Hx of past syncopal episodes nor any of blood clots or Ca. He does note his aunt told him there is known hx of clotting in his family.     ED: Pt found well appearing, tachycardic but not hypoxic or hypotensive. Started on heparin drip.   He was also seen by cardiology and no immediate surgical intervention was deemed necessary. (07 Nov 2023 23:26)    Hospital Course:  Patient was transferred from [facility] after found to have large bilateral PE in Pulm arteries extending to lobar branches seen on CTA with associated right heart strain after a syncopal event. Patient was not hypoxic or hypotensive on admission.  Echocardiogram consisted with right heart strain and severe pulmonary hypertension. LE Doppler with acute above and below the knee left lower extremity DVT affecting the femoral and popliteal veins, the posterior tibial peroneal trunk and the left posterior tibial peroneal and gastrocnemius veins. Bilaterally thrombosed, varicose great saphenous veins. Maintained on heparin gtt during admission. Underlying reason for PE remains unclear, has had no cancer screenings and PSA/CEA remain within normal limits. Refer for outpatient colonoscopy. Hematology consulted, hypercoagulable workup in progress. Vascular cardiology consulted as patient remained tachycardiac on telemetry, likely related to PE. No additional interventions were recommended, patient will follow up with their office outpatient. Follow up with hematology outpatient for APL workup hypercoagulable work up to see if pt AC need to be switched.     Important Medication Changes and Reason:  Eliquis 10 mg bid for 7 days, then 5mg bid with follow up with heme for genetic APL hypercoaguable labs.     Active or Pending Issues Requiring Follow-up:  Follow up with vascular cardiology outpatient.  Follow up with hematology outpatient to complete hypercoagulable workup.  Follow up with dentist for tooth repair   Follow up with PCP- pt should have colonoscopy done for routine cancer screening     Advanced Directives:   [x] Full code  [ ] DNR  [ ] Hospice    Discharge Diagnoses:  Large bilateral Pulmonary embolism with right heart strain  Left above and below knee DVT  Hypertension

## 2023-11-10 NOTE — CONSULT NOTE ADULT - REASON FOR ADMISSION
Pulmonary embolism w/ R heart strain

## 2023-11-10 NOTE — CONSULT NOTE ADULT - SUBJECTIVE AND OBJECTIVE BOX
HEMATOLOGY ONCOLOGY CONSULT     Patient is a 53y old  Male who presents with a chief complaint of Pulmonary embolism w/ R heart strain (09 Nov 2023 15:18)      HPI:  53M PMHx of HTN, HLD, and chronic venous insufficiency, transferred from [facility] after presenting for syncopal episode and found to have bilateral PE. Pt states the day prior he had felt dizzy and lightheaded briefly before continuing on with his day. Today (11/7) he experienced this same sensation while arising from lying down following a nap. Later in the same day while in the parking lot the pt fainted striking the ground face first. The event was unwitnessed and he quickly recovered on his own without any stigmata of a postictal state such as confusion. He subsequently proceeded to drive home and was told by his aunt to go to the ED. At [facility] he was found to have bilateral large PE in Pulm arteries w/ evidence of R heart strain. They transferred him to our services for possible surgical intervention. No fever, cp, sob, abd pain, n/v. He has no Hx of past syncopal episodes nor any of blood clots or Ca. He does note his aunt told him there is known hx of clotting in his family.     ED: Pt found well appearing, tachycardic but not hypoxic or hypotensive. Started on heparin drip.   He was also seen by cardiology and no immediate surgical intervention was deemed necessary. (07 Nov 2023 23:26)       ROS:  Negative except for:    PAST MEDICAL & SURGICAL HISTORY:  HTN (hypertension)      HLD (hyperlipidemia)      Lower extremity edema      No significant past surgical history          SOCIAL HISTORY:    FAMILY HISTORY:  Family history of clotting disorder (Aunt)        MEDICATIONS  (STANDING):  apixaban 10 milliGRAM(s) Oral every 12 hours  atorvastatin 20 milliGRAM(s) Oral at bedtime  Biotene Dry Mouth Oral Rinse 15 milliLiter(s) Swish and Spit two times a day  influenza   Vaccine 0.5 milliLiter(s) IntraMuscular once  metoprolol tartrate 12.5 milliGRAM(s) Oral two times a day    MEDICATIONS  (PRN):      Allergies    No Known Allergies    Intolerances        Vital Signs Last 24 Hrs  T(C): 37.3 (10 Nov 2023 11:12), Max: 37.4 (09 Nov 2023 20:30)  T(F): 99.2 (10 Nov 2023 11:12), Max: 99.3 (09 Nov 2023 20:30)  HR: 112 (10 Nov 2023 11:12) (96 - 118)  BP: 146/99 (10 Nov 2023 11:12) (130/93 - 159/97)  BP(mean): --  RR: 18 (10 Nov 2023 11:12) (17 - 18)  SpO2: 99% (10 Nov 2023 11:12) (97% - 100%)    Parameters below as of 10 Nov 2023 11:12  Patient On (Oxygen Delivery Method): room air        PHYSICAL EXAM  General: adult in NAD  HEENT: clear oropharynx, anicteric sclera, pink conjunctiva  Neck: supple  CV: normal S1/S2 with no murmur rubs or gallops  Lungs: positive air movement b/l ant lungs,clear to auscultation, no wheezes, no rales  Abdomen: soft non-tender non-distended, no hepatosplenomegaly  Ext: no clubbing cyanosis or edema  Skin: no rashes and no petechiae  Neuro: alert and oriented X 4, no focal deficits      11-09-23 @ 07:01  -  11-10-23 @ 07:00  --------------------------------------------------------  IN: 0 mL / OUT: 500 mL / NET: -500 mL      LABS:                          11.6   6.35  )-----------( 102      ( 10 Nov 2023 06:16 )             34.6         Mean Cell Volume : 86.5 fl  Mean Cell Hemoglobin : 29.0 pg  Mean Cell Hemoglobin Concentration : 33.5 gm/dL  Auto Neutrophil # : x  Auto Lymphocyte # : x  Auto Monocyte # : x  Auto Eosinophil # : x  Auto Basophil # : x  Auto Neutrophil % : x  Auto Lymphocyte % : x  Auto Monocyte % : x  Auto Eosinophil % : x  Auto Basophil % : x      11-10    136  |  101  |  10  ----------------------------<  109<H>  3.8   |  21<L>  |  0.99    Ca    9.9      10 Nov 2023 06:12  Phos  3.2     11-09  Mg     1.8     11-09        PTT - ( 10 Nov 2023 06:16 )  PTT:29.8 sec                BLOOD SMEAR INTERPRETATION:       RADIOLOGY & ADDITIONAL STUDIES:       HEMATOLOGY ONCOLOGY CONSULT     Patient is a 53y old  Male who presents with a chief complaint of Pulmonary embolism w/ R heart strain (09 Nov 2023 15:18)      HPI:  53M PMHx of HTN, HLD, and chronic venous insufficiency, transferred from [facility] after presenting for syncopal episode and found to have bilateral PE. Pt states the day prior he had felt dizzy and lightheaded briefly before continuing on with his day. On Tuesday 11/7, he experienced this same sensation while arising from lying down following a nap. Later in the same day while in the parking lot the pt fainted striking the ground face first. The event was unwitnessed and he quickly recovered on his own without any stigmata of a postictal state such as confusion. He subsequently proceeded to drive home and was told by his aunt to go to the ED. At [facility] he was found to have bilateral large PE in Pulm arteries w/ evidence of R heart strain. They transferred him to our services for possible surgical intervention. No fever, cp, sob, abd pain, n/v. He has no Hx of past syncopal episodes nor any of blood clots or Ca. He does have an uncle who had a hx of clots, but no other fam hx of hypercoagulability. He has never had a colonoscopy, but had his PSA checked which was negative.     ED: Pt found well appearing, tachycardic but not hypoxic or hypotensive. Started on heparin drip.   He was also seen by cardiology and no immediate surgical intervention was deemed necessary. (07 Nov 2023 23:26)       ROS:  Gen: No fever, normal appetite  Eyes: No eye irritation or discharge  ENT: No ear pain, congestion, sore throat  Resp: +SOB  Cardiovascular: +palpitation  Gastroenteric: No nausea/vomiting, diarrhea, constipation  :  No change in urine output; no dysuria  MS: No joint or muscle pain  Skin: No rashes  Neuro: No headache; no abnormal movements  Remainder negative, except as per the HPI    Negative except for:    PAST MEDICAL & SURGICAL HISTORY:  HTN (hypertension)      HLD (hyperlipidemia)      Lower extremity edema      No significant past surgical history          SOCIAL HISTORY:    FAMILY HISTORY:  Family history of clotting disorder (Aunt)        MEDICATIONS  (STANDING):  apixaban 10 milliGRAM(s) Oral every 12 hours  atorvastatin 20 milliGRAM(s) Oral at bedtime  Biotene Dry Mouth Oral Rinse 15 milliLiter(s) Swish and Spit two times a day  influenza   Vaccine 0.5 milliLiter(s) IntraMuscular once  metoprolol tartrate 12.5 milliGRAM(s) Oral two times a day    MEDICATIONS  (PRN):      Allergies    No Known Allergies    Intolerances        Vital Signs Last 24 Hrs  T(C): 37.3 (10 Nov 2023 11:12), Max: 37.4 (09 Nov 2023 20:30)  T(F): 99.2 (10 Nov 2023 11:12), Max: 99.3 (09 Nov 2023 20:30)  HR: 112 (10 Nov 2023 11:12) (96 - 118)  BP: 146/99 (10 Nov 2023 11:12) (130/93 - 159/97)  BP(mean): --  RR: 18 (10 Nov 2023 11:12) (17 - 18)  SpO2: 99% (10 Nov 2023 11:12) (97% - 100%)    Parameters below as of 10 Nov 2023 11:12  Patient On (Oxygen Delivery Method): room air        PHYSICAL EXAM  General: adult in NAD  HEENT: clear oropharynx, anicteric sclera, pink conjunctiva  Neck: +JVP  CV: normal S1/S2 with no murmur rubs or gallops  Lungs: positive air movement b/l ant lungs,clear to auscultation, no wheezes, no rales  Abdomen: soft non-tender non-distended, no hepatosplenomegaly  Ext: b/l edema   Skin: no rashes and no petechiae  Neuro: alert and oriented X 4, no focal deficits      11-09-23 @ 07:01  -  11-10-23 @ 07:00  --------------------------------------------------------  IN: 0 mL / OUT: 500 mL / NET: -500 mL      LABS:                          11.6   6.35  )-----------( 102      ( 10 Nov 2023 06:16 )             34.6         Mean Cell Volume : 86.5 fl  Mean Cell Hemoglobin : 29.0 pg  Mean Cell Hemoglobin Concentration : 33.5 gm/dL  Auto Neutrophil # : x  Auto Lymphocyte # : x  Auto Monocyte # : x  Auto Eosinophil # : x  Auto Basophil # : x  Auto Neutrophil % : x  Auto Lymphocyte % : x  Auto Monocyte % : x  Auto Eosinophil % : x  Auto Basophil % : x      11-10    136  |  101  |  10  ----------------------------<  109<H>  3.8   |  21<L>  |  0.99    Ca    9.9      10 Nov 2023 06:12  Phos  3.2     11-09  Mg     1.8     11-09        PTT - ( 10 Nov 2023 06:16 )  PTT:29.8 sec                BLOOD SMEAR INTERPRETATION:       RADIOLOGY & ADDITIONAL STUDIES:  < from: CT Abdomen and Pelvis w/ IV Cont (11.10.23 @ 05:39) >  IMPRESSION:  Acute pulmonary emboli within partially visualized lower lobes. Recommend   CT chest PE study to assess the full extent of pulmonary embolism    < end of copied text >  < from: VA Duplex Lower Ext Vein Scan, Bilat (11.08.23 @ 09:59) >  IMPRESSION:    There is acute above and below the knee left lower extremity DVT   affecting the femoral and popliteal veins, the posterior tibial peroneal   trunk and the left posterior tibial peroneal and gastrocnemius veins.    There are bilaterally thrombosed, varicose great saphenous veins.    < end of copied text >       HEMATOLOGY ONCOLOGY CONSULT     Patient is a 53y old  Male who presents with a chief complaint of Pulmonary embolism w/ R heart strain (09 Nov 2023 15:18)      HPI:  53M PMHx of HTN, HLD, and chronic venous insufficiency, transferred from [facility] after presenting for syncopal episode and found to have bilateral PE. Pt states the day prior he had felt dizzy and lightheaded briefly before continuing on with his day. On Tuesday 11/7, he experienced this same sensation while arising from lying down following a nap. Later in the same day while in the parking lot the pt fainted striking the ground face first. The event was unwitnessed and he quickly recovered on his own without any stigmata of a postictal state such as confusion. He subsequently proceeded to drive home and was told by his aunt to go to the ED. At [facility] he was found to have bilateral large PE in Pulm arteries w/ evidence of R heart strain. They transferred him to our services for possible surgical intervention. No fever, cp, sob, abd pain, n/v. He has no Hx of past syncopal episodes nor any of blood clots or Ca. He has a history of venous reflux and lower extremity edema after being on his feet all day. He has a very active lifestyle and works two jobs at [employer] and [employer]. He does have an uncle who had a hx of clots, but no other fam hx of hypercoagulability. He has never had a colonoscopy, but had his PSA checked which was negative.     ED: Pt found well appearing, tachycardic but not hypoxic or hypotensive. Started on heparin drip.   He was also seen by cardiology and no immediate surgical intervention was deemed necessary. (07 Nov 2023 23:26)       ROS:  Gen: No fever, normal appetite  Eyes: No eye irritation or discharge  ENT: No ear pain, congestion, sore throat  Resp: +SOB  Cardiovascular: +palpitation  Gastroenteric: No nausea/vomiting, diarrhea, constipation  :  No change in urine output; no dysuria  MS: No joint or muscle pain  Skin: No rashes  Neuro: No headache; no abnormal movements  Remainder negative, except as per the HPI    Negative except for:    PAST MEDICAL & SURGICAL HISTORY:  HTN (hypertension)      HLD (hyperlipidemia)      Lower extremity edema      No significant past surgical history          SOCIAL HISTORY:    FAMILY HISTORY:  Family history of clotting disorder (Aunt)        MEDICATIONS  (STANDING):  apixaban 10 milliGRAM(s) Oral every 12 hours  atorvastatin 20 milliGRAM(s) Oral at bedtime  Biotene Dry Mouth Oral Rinse 15 milliLiter(s) Swish and Spit two times a day  influenza   Vaccine 0.5 milliLiter(s) IntraMuscular once  metoprolol tartrate 12.5 milliGRAM(s) Oral two times a day    MEDICATIONS  (PRN):      Allergies    No Known Allergies    Intolerances        Vital Signs Last 24 Hrs  T(C): 37.3 (10 Nov 2023 11:12), Max: 37.4 (09 Nov 2023 20:30)  T(F): 99.2 (10 Nov 2023 11:12), Max: 99.3 (09 Nov 2023 20:30)  HR: 112 (10 Nov 2023 11:12) (96 - 118)  BP: 146/99 (10 Nov 2023 11:12) (130/93 - 159/97)  BP(mean): --  RR: 18 (10 Nov 2023 11:12) (17 - 18)  SpO2: 99% (10 Nov 2023 11:12) (97% - 100%)    Parameters below as of 10 Nov 2023 11:12  Patient On (Oxygen Delivery Method): room air        PHYSICAL EXAM  General: adult in NAD  HEENT: clear oropharynx, anicteric sclera, pink conjunctiva  Neck: +JVP  CV: normal S1/S2 with no murmur rubs or gallops  Lungs: positive air movement b/l ant lungs,clear to auscultation, no wheezes, no rales  Abdomen: soft non-tender non-distended, no hepatosplenomegaly  Ext: b/l edema   Skin: no rashes and no petechiae  Neuro: alert and oriented X 4, no focal deficits      11-09-23 @ 07:01  -  11-10-23 @ 07:00  --------------------------------------------------------  IN: 0 mL / OUT: 500 mL / NET: -500 mL      LABS:                          11.6   6.35  )-----------( 102      ( 10 Nov 2023 06:16 )             34.6         Mean Cell Volume : 86.5 fl  Mean Cell Hemoglobin : 29.0 pg  Mean Cell Hemoglobin Concentration : 33.5 gm/dL  Auto Neutrophil # : x  Auto Lymphocyte # : x  Auto Monocyte # : x  Auto Eosinophil # : x  Auto Basophil # : x  Auto Neutrophil % : x  Auto Lymphocyte % : x  Auto Monocyte % : x  Auto Eosinophil % : x  Auto Basophil % : x      11-10    136  |  101  |  10  ----------------------------<  109<H>  3.8   |  21<L>  |  0.99    Ca    9.9      10 Nov 2023 06:12  Phos  3.2     11-09  Mg     1.8     11-09        PTT - ( 10 Nov 2023 06:16 )  PTT:29.8 sec    Hypercoagulability workup:  Antithrombin III Antigen (11.09.23 @ 06:45): 21 mg/dL  Antithrombin III Assay with Reflex (11.09.23 @ 06:45): 75  -Factor V Assay: 98:     Onc workup:  Prostate Ca Screen, PSA Total (11.09.23 @ 06:45) 1.40: Method: Roche PSA Electrochemiluminescent Immunoassay   Carcinoembryonic Antigen (11.09.23 @ 06:45) 1.4: Method: Roche Electrochemiluminescence Immuno Assay     BLOOD SMEAR INTERPRETATION:       RADIOLOGY & ADDITIONAL STUDIES:  < from: CT Abdomen and Pelvis w/ IV Cont (11.10.23 @ 05:39) >  IMPRESSION:  Acute pulmonary emboli within partially visualized lower lobes. Recommend   CT chest PE study to assess the full extent of pulmonary embolism    < end of copied text >  < from: VA Duplex Lower Ext Vein Scan, Bilat (11.08.23 @ 09:59) >  IMPRESSION:    There is acute above and below the knee left lower extremity DVT   affecting the femoral and popliteal veins, the posterior tibial peroneal   trunk and the left posterior tibial peroneal and gastrocnemius veins.    There are bilaterally thrombosed, varicose great saphenous veins.    < end of copied text >    < from: TTE Limited W or WO Ultrasound Enhancing Agent (11.07.23 @ 20:09) >  FINDINGS:     Left Ventricle:  The left ventricle was not well visualized. The left ventricular cavity is normal. Left ventricular wall thickness is normal. The interventricular septum is flattened in systole consistent with right ventricular pressure overload. Left ventricular endocardium is not well visualized; however, the left ventricular systolic function appears grossly normal.     Right Ventricle:  The right ventricular cavity is enlarged in size and reduced systolic function. The right ventricular fractional area change is 15.6 %.     Left Atrium:  The left atrium is normal in size.     Right Atrium:  The right atrium is normal in size with an indexed volume of 35.55 ml/m² and an indexed area of 10.16 cm²/m².    < end of copied text >     HEMATOLOGY ONCOLOGY CONSULT     Patient is a 53y old  Male who presents with a chief complaint of Pulmonary embolism w/ R heart strain (09 Nov 2023 15:18)      HPI:  53M PMHx of HTN, HLD, and chronic venous insufficiency, transferred from [facility] after presenting for syncopal episode and found to have bilateral PE. Pt states the day prior he had felt dizzy and lightheaded briefly before continuing on with his day. On Tuesday 11/7, he experienced this same sensation while arising from lying down following a nap. Later in the same day while in the parking lot the pt fainted striking the ground face first. The event was unwitnessed and he quickly recovered on his own without any stigmata of a postictal state such as confusion. He subsequently proceeded to drive home and was told by his aunt to go to the ED. At [facility] he was found to have bilateral large PE in Pulm arteries w/ evidence of R heart strain. They transferred him to our services for possible surgical intervention. No fever, cp, sob, abd pain, n/v. He has no Hx of past syncopal episodes nor any of blood clots or Ca. He has a history of venous reflux for the past 7 years and lower extremity edema after being on his feet all day. He has a very active lifestyle and works two jobs at [employer] and [employer]. He does have an uncle who had a hx of clots but does not know much about his history and can recall no other fam hx of hypercoagulability. He has never had a colonoscopy, but had his PSA checked which was negative. No recent history of travel or prior surgery.     ED: Pt found well appearing, tachycardic but not hypoxic or hypotensive. Started on heparin drip.   He was also seen by cardiology and no immediate surgical intervention was deemed necessary. (07 Nov 2023 23:26)       ROS:  Gen: No fever, normal appetite  Eyes: No eye irritation or discharge  ENT: No ear pain, congestion, sore throat  Resp: +SOB  Cardiovascular: +palpitation  Gastroenteric: No nausea/vomiting, diarrhea, constipation  :  No change in urine output; no dysuria  MS: No joint or muscle pain  Skin: No rashes  Neuro: No headache; no abnormal movements  Remainder negative, except as per the HPI    Negative except for:    PAST MEDICAL & SURGICAL HISTORY:  HTN (hypertension)      HLD (hyperlipidemia)      Lower extremity edema      No significant past surgical history        SOCIAL HISTORY:  Works two jobs at [employer] and [employer]  Lives an active lifestyle     FAMILY HISTORY:  Family history of clotting disorder (Uncle)        MEDICATIONS  (STANDING):  apixaban 10 milliGRAM(s) Oral every 12 hours  atorvastatin 20 milliGRAM(s) Oral at bedtime  Biotene Dry Mouth Oral Rinse 15 milliLiter(s) Swish and Spit two times a day  influenza   Vaccine 0.5 milliLiter(s) IntraMuscular once  metoprolol tartrate 12.5 milliGRAM(s) Oral two times a day    MEDICATIONS  (PRN):      Allergies    No Known Allergies    Intolerances        Vital Signs Last 24 Hrs  T(C): 37.3 (10 Nov 2023 11:12), Max: 37.4 (09 Nov 2023 20:30)  T(F): 99.2 (10 Nov 2023 11:12), Max: 99.3 (09 Nov 2023 20:30)  HR: 112 (10 Nov 2023 11:12) (96 - 118)  BP: 146/99 (10 Nov 2023 11:12) (130/93 - 159/97)  BP(mean): --  RR: 18 (10 Nov 2023 11:12) (17 - 18)  SpO2: 99% (10 Nov 2023 11:12) (97% - 100%)    Parameters below as of 10 Nov 2023 11:12  Patient On (Oxygen Delivery Method): room air        PHYSICAL EXAM  General: adult in NAD  HEENT: clear oropharynx, anicteric sclera, pink conjunctiva  Neck: +JVP  CV: normal S1/S2 with no murmur rubs or gallops  Lungs: positive air movement b/l ant lungs,clear to auscultation, no wheezes, no rales  Abdomen: soft non-tender non-distended, no hepatosplenomegaly  Ext: b/l edema   Skin: no rashes and no petechiae  Neuro: alert and oriented X 4, no focal deficits      11-09-23 @ 07:01  -  11-10-23 @ 07:00  --------------------------------------------------------  IN: 0 mL / OUT: 500 mL / NET: -500 mL      LABS:                          11.6   6.35  )-----------( 102      ( 10 Nov 2023 06:16 )             34.6         Mean Cell Volume : 86.5 fl  Mean Cell Hemoglobin : 29.0 pg  Mean Cell Hemoglobin Concentration : 33.5 gm/dL  Auto Neutrophil # : x  Auto Lymphocyte # : x  Auto Monocyte # : x  Auto Eosinophil # : x  Auto Basophil # : x  Auto Neutrophil % : x  Auto Lymphocyte % : x  Auto Monocyte % : x  Auto Eosinophil % : x  Auto Basophil % : x      11-10    136  |  101  |  10  ----------------------------<  109<H>  3.8   |  21<L>  |  0.99    Ca    9.9      10 Nov 2023 06:12  Phos  3.2     11-09  Mg     1.8     11-09        PTT - ( 10 Nov 2023 06:16 )  PTT:29.8 sec    Hypercoagulability workup:  Antithrombin III Antigen (11.09.23 @ 06:45): 21 mg/dL  Antithrombin III Assay with Reflex (11.09.23 @ 06:45): 75  -Factor V Assay: 98:     Onc workup:  Prostate Ca Screen, PSA Total (11.09.23 @ 06:45) 1.40: Method: Roche PSA Electrochemiluminescent Immunoassay   Carcinoembryonic Antigen (11.09.23 @ 06:45) 1.4: Method: Roche Electrochemiluminescence Immuno Assay     BLOOD SMEAR INTERPRETATION:       RADIOLOGY & ADDITIONAL STUDIES:  < from: CT Abdomen and Pelvis w/ IV Cont (11.10.23 @ 05:39) >  IMPRESSION:  Acute pulmonary emboli within partially visualized lower lobes. Recommend   CT chest PE study to assess the full extent of pulmonary embolism    < end of copied text >  < from: VA Duplex Lower Ext Vein Scan, Bilat (11.08.23 @ 09:59) >  IMPRESSION:    There is acute above and below the knee left lower extremity DVT   affecting the femoral and popliteal veins, the posterior tibial peroneal   trunk and the left posterior tibial peroneal and gastrocnemius veins.    There are bilaterally thrombosed, varicose great saphenous veins.    < end of copied text >    < from: TTE Limited W or WO Ultrasound Enhancing Agent (11.07.23 @ 20:09) >  FINDINGS:     Left Ventricle:  The left ventricle was not well visualized. The left ventricular cavity is normal. Left ventricular wall thickness is normal. The interventricular septum is flattened in systole consistent with right ventricular pressure overload. Left ventricular endocardium is not well visualized; however, the left ventricular systolic function appears grossly normal.     Right Ventricle:  The right ventricular cavity is enlarged in size and reduced systolic function. The right ventricular fractional area change is 15.6 %.     Left Atrium:  The left atrium is normal in size.     Right Atrium:  The right atrium is normal in size with an indexed volume of 35.55 ml/m² and an indexed area of 10.16 cm²/m².    < end of copied text >     HEMATOLOGY ONCOLOGY CONSULT     Patient is a 53y old  Male who presents with a chief complaint of Pulmonary embolism w/ R heart strain (09 Nov 2023 15:18)      HPI:  53M PMHx of HTN, HLD, and chronic venous insufficiency, transferred from [facility] after presenting for syncopal episode and found to have bilateral PE. Pt states the day prior he had felt dizzy and lightheaded briefly before continuing on with his day. On Tuesday 11/7, he experienced this same sensation while arising from lying down following a nap. Later in the same day while in the parking lot the pt fainted striking the ground face first. The event was unwitnessed and he quickly recovered on his own without any stigmata of a postictal state such as confusion. He subsequently proceeded to drive home and was told by his aunt to go to the ED. At [facility] he was found to have bilateral large PE in Pulm arteries w/ evidence of R heart strain. They transferred him to our services for possible surgical intervention. No fever, cp, sob, abd pain, n/v. He has no Hx of past syncopal episodes nor any of blood clots or Ca. He has a history of venous reflux for the past 7 years and lower extremity edema after being on his feet all day. He has been wearing compression stockings for the past 7 years. He has a very active lifestyle and works two jobs at [employer] and [employer]. He does have an uncle who had a hx of clots but does not know much about his history and can recall no other fam hx of hypercoagulability. He has never had a colonoscopy, but had his PSA checked which was negative. No recent history of travel or prior surgery.     ED: Pt found well appearing, tachycardic but not hypoxic or hypotensive. Started on heparin drip.   He was also seen by cardiology and no immediate surgical intervention was deemed necessary. (07 Nov 2023 23:26)       ROS:  Gen: No fever, normal appetite  Eyes: No eye irritation or discharge  ENT: No ear pain, congestion, sore throat  Resp: +SOB  Cardiovascular: +palpitation  Gastroenteric: No nausea/vomiting, diarrhea, constipation  :  No change in urine output; no dysuria  MS: No joint or muscle pain  Skin: No rashes  Neuro: No headache; no abnormal movements  Remainder negative, except as per the HPI    Negative except for:    PAST MEDICAL & SURGICAL HISTORY:  HTN (hypertension)      HLD (hyperlipidemia)      Lower extremity edema      No significant past surgical history        SOCIAL HISTORY:  Works two jobs at [employer] and [employer]  Lives an active lifestyle     FAMILY HISTORY:  Family history of clotting disorder (Uncle)        MEDICATIONS  (STANDING):  apixaban 10 milliGRAM(s) Oral every 12 hours  atorvastatin 20 milliGRAM(s) Oral at bedtime  Biotene Dry Mouth Oral Rinse 15 milliLiter(s) Swish and Spit two times a day  influenza   Vaccine 0.5 milliLiter(s) IntraMuscular once  metoprolol tartrate 12.5 milliGRAM(s) Oral two times a day    MEDICATIONS  (PRN):      Allergies    No Known Allergies    Intolerances        Vital Signs Last 24 Hrs  T(C): 37.3 (10 Nov 2023 11:12), Max: 37.4 (09 Nov 2023 20:30)  T(F): 99.2 (10 Nov 2023 11:12), Max: 99.3 (09 Nov 2023 20:30)  HR: 112 (10 Nov 2023 11:12) (96 - 118)  BP: 146/99 (10 Nov 2023 11:12) (130/93 - 159/97)  BP(mean): --  RR: 18 (10 Nov 2023 11:12) (17 - 18)  SpO2: 99% (10 Nov 2023 11:12) (97% - 100%)    Parameters below as of 10 Nov 2023 11:12  Patient On (Oxygen Delivery Method): room air        PHYSICAL EXAM  General: adult in NAD  HEENT: clear oropharynx, anicteric sclera, pink conjunctiva  Neck: +JVP  CV: normal S1/S2 with no murmur rubs or gallops  Lungs: positive air movement b/l ant lungs,clear to auscultation, no wheezes, no rales  Abdomen: soft non-tender non-distended, no hepatosplenomegaly  Ext: b/l edema   Skin: no rashes and no petechiae  Neuro: alert and oriented X 4, no focal deficits      11-09-23 @ 07:01  -  11-10-23 @ 07:00  --------------------------------------------------------  IN: 0 mL / OUT: 500 mL / NET: -500 mL      LABS:                          11.6   6.35  )-----------( 102      ( 10 Nov 2023 06:16 )             34.6         Mean Cell Volume : 86.5 fl  Mean Cell Hemoglobin : 29.0 pg  Mean Cell Hemoglobin Concentration : 33.5 gm/dL  Auto Neutrophil # : x  Auto Lymphocyte # : x  Auto Monocyte # : x  Auto Eosinophil # : x  Auto Basophil # : x  Auto Neutrophil % : x  Auto Lymphocyte % : x  Auto Monocyte % : x  Auto Eosinophil % : x  Auto Basophil % : x      11-10    136  |  101  |  10  ----------------------------<  109<H>  3.8   |  21<L>  |  0.99    Ca    9.9      10 Nov 2023 06:12  Phos  3.2     11-09  Mg     1.8     11-09        PTT - ( 10 Nov 2023 06:16 )  PTT:29.8 sec    Hypercoagulability workup:  Antithrombin III Antigen (11.09.23 @ 06:45): 21 mg/dL  Antithrombin III Assay with Reflex (11.09.23 @ 06:45): 75  -Factor V Assay: 98:     Onc workup:  Prostate Ca Screen, PSA Total (11.09.23 @ 06:45) 1.40: Method: Roche PSA Electrochemiluminescent Immunoassay   Carcinoembryonic Antigen (11.09.23 @ 06:45) 1.4: Method: Roche Electrochemiluminescence Immuno Assay     BLOOD SMEAR INTERPRETATION:       RADIOLOGY & ADDITIONAL STUDIES:  < from: CT Abdomen and Pelvis w/ IV Cont (11.10.23 @ 05:39) >  IMPRESSION:  Acute pulmonary emboli within partially visualized lower lobes. Recommend   CT chest PE study to assess the full extent of pulmonary embolism    < end of copied text >  < from: VA Duplex Lower Ext Vein Scan, Bilat (11.08.23 @ 09:59) >  IMPRESSION:    There is acute above and below the knee left lower extremity DVT   affecting the femoral and popliteal veins, the posterior tibial peroneal   trunk and the left posterior tibial peroneal and gastrocnemius veins.    There are bilaterally thrombosed, varicose great saphenous veins.    < end of copied text >    < from: TTE Limited W or WO Ultrasound Enhancing Agent (11.07.23 @ 20:09) >  FINDINGS:     Left Ventricle:  The left ventricle was not well visualized. The left ventricular cavity is normal. Left ventricular wall thickness is normal. The interventricular septum is flattened in systole consistent with right ventricular pressure overload. Left ventricular endocardium is not well visualized; however, the left ventricular systolic function appears grossly normal.     Right Ventricle:  The right ventricular cavity is enlarged in size and reduced systolic function. The right ventricular fractional area change is 15.6 %.     Left Atrium:  The left atrium is normal in size.     Right Atrium:  The right atrium is normal in size with an indexed volume of 35.55 ml/m² and an indexed area of 10.16 cm²/m².    < end of copied text >     HEMATOLOGY ONCOLOGY CONSULT     Patient is a 53y old  Male who presents with a chief complaint of Pulmonary embolism w/ R heart strain (09 Nov 2023 15:18)      HPI:  53M PMHx of HTN, HLD, and chronic venous insufficiency, transferred from [facility] after presenting for syncopal episode and found to have bilateral PE. Pt states the day prior he had felt dizzy and lightheaded briefly before continuing on with his day. On Tuesday 11/7, he experienced this same sensation while arising from lying down following a nap. Later in the same day while in the parking lot the pt fainted striking the ground face first. The event was unwitnessed and he quickly recovered on his own without any stigmata of a postictal state such as confusion. He subsequently proceeded to drive home and was told by his aunt to go to the ED. At [facility] he was found to have bilateral large PE in Pulm arteries w/ evidence of R heart strain. They transferred him to our services for possible surgical intervention. No fever, cp, sob, abd pain, n/v. He has no Hx of past syncopal episodes nor any of blood clots or Ca. He has a history of venous reflux for the past 7 years and lower extremity edema after being on his feet all day. He has been wearing compression stockings for the past 7 years. He has a very active lifestyle and works two jobs at [employer] and [employer]. He does have an uncle who had a hx of clots but does not know much about his history and can recall no other fam hx of hypercoagulability. He has never had a colonoscopy and reports no changes in his stool consistency or urinary output. No recent history of travel or prior surgery.     ED: Pt found well appearing, tachycardic but not hypoxic or hypotensive. Started on heparin drip.   He was also seen by cardiology and no immediate surgical intervention was deemed necessary. (07 Nov 2023 23:26)       ROS:  Gen: No fever, normal appetite  Eyes: No eye irritation or discharge  ENT: No ear pain, congestion, sore throat  Resp: +SOB  Cardiovascular: +palpitation  Gastroenteric: No nausea/vomiting, diarrhea, constipation  :  No change in urine output; no dysuria  MS: No joint or muscle pain  Skin: No rashes  Neuro: No headache; no abnormal movements  Remainder negative, except as per the HPI    Negative except for:    PAST MEDICAL & SURGICAL HISTORY:  HTN (hypertension)      HLD (hyperlipidemia)      Lower extremity edema      No significant past surgical history        SOCIAL HISTORY:  Works two jobs at [employer] and [employer]  Lives an active lifestyle     FAMILY HISTORY:  Family history of clotting disorder (Uncle)        MEDICATIONS  (STANDING):  apixaban 10 milliGRAM(s) Oral every 12 hours  atorvastatin 20 milliGRAM(s) Oral at bedtime  Biotene Dry Mouth Oral Rinse 15 milliLiter(s) Swish and Spit two times a day  influenza   Vaccine 0.5 milliLiter(s) IntraMuscular once  metoprolol tartrate 12.5 milliGRAM(s) Oral two times a day    MEDICATIONS  (PRN):      Allergies    No Known Allergies    Intolerances        Vital Signs Last 24 Hrs  T(C): 37.3 (10 Nov 2023 11:12), Max: 37.4 (09 Nov 2023 20:30)  T(F): 99.2 (10 Nov 2023 11:12), Max: 99.3 (09 Nov 2023 20:30)  HR: 112 (10 Nov 2023 11:12) (96 - 118)  BP: 146/99 (10 Nov 2023 11:12) (130/93 - 159/97)  BP(mean): --  RR: 18 (10 Nov 2023 11:12) (17 - 18)  SpO2: 99% (10 Nov 2023 11:12) (97% - 100%)    Parameters below as of 10 Nov 2023 11:12  Patient On (Oxygen Delivery Method): room air        PHYSICAL EXAM  General: adult in NAD  HEENT: clear oropharynx, anicteric sclera, pink conjunctiva  Neck: +JVP  CV: normal S1/S2 with no murmur rubs or gallops  Lungs: positive air movement b/l ant lungs,clear to auscultation, no wheezes, no rales  Abdomen: soft non-tender non-distended, no hepatosplenomegaly  Ext: b/l edema   Skin: no rashes and no petechiae  Neuro: alert and oriented X 4, no focal deficits      11-09-23 @ 07:01  -  11-10-23 @ 07:00  --------------------------------------------------------  IN: 0 mL / OUT: 500 mL / NET: -500 mL      LABS:                          11.6   6.35  )-----------( 102      ( 10 Nov 2023 06:16 )             34.6         Mean Cell Volume : 86.5 fl  Mean Cell Hemoglobin : 29.0 pg  Mean Cell Hemoglobin Concentration : 33.5 gm/dL  Auto Neutrophil # : x  Auto Lymphocyte # : x  Auto Monocyte # : x  Auto Eosinophil # : x  Auto Basophil # : x  Auto Neutrophil % : x  Auto Lymphocyte % : x  Auto Monocyte % : x  Auto Eosinophil % : x  Auto Basophil % : x      11-10    136  |  101  |  10  ----------------------------<  109<H>  3.8   |  21<L>  |  0.99    Ca    9.9      10 Nov 2023 06:12  Phos  3.2     11-09  Mg     1.8     11-09        PTT - ( 10 Nov 2023 06:16 )  PTT:29.8 sec    Hypercoagulability workup:  Antithrombin III Antigen (11.09.23 @ 06:45): 21 mg/dL  Antithrombin III Assay with Reflex (11.09.23 @ 06:45): 75  -Factor V Assay: 98:     Onc workup:  Prostate Ca Screen, PSA Total (11.09.23 @ 06:45) 1.40: Method: Roche PSA Electrochemiluminescent Immunoassay   Carcinoembryonic Antigen (11.09.23 @ 06:45) 1.4: Method: Roche Electrochemiluminescence Immuno Assay     BLOOD SMEAR INTERPRETATION:       RADIOLOGY & ADDITIONAL STUDIES:  < from: CT Abdomen and Pelvis w/ IV Cont (11.10.23 @ 05:39) >  IMPRESSION:  Acute pulmonary emboli within partially visualized lower lobes. Recommend   CT chest PE study to assess the full extent of pulmonary embolism    < end of copied text >  < from: VA Duplex Lower Ext Vein Scan, Bilat (11.08.23 @ 09:59) >  IMPRESSION:    There is acute above and below the knee left lower extremity DVT   affecting the femoral and popliteal veins, the posterior tibial peroneal   trunk and the left posterior tibial peroneal and gastrocnemius veins.    There are bilaterally thrombosed, varicose great saphenous veins.    < end of copied text >    < from: TTE Limited W or WO Ultrasound Enhancing Agent (11.07.23 @ 20:09) >  FINDINGS:     Left Ventricle:  The left ventricle was not well visualized. The left ventricular cavity is normal. Left ventricular wall thickness is normal. The interventricular septum is flattened in systole consistent with right ventricular pressure overload. Left ventricular endocardium is not well visualized; however, the left ventricular systolic function appears grossly normal.     Right Ventricle:  The right ventricular cavity is enlarged in size and reduced systolic function. The right ventricular fractional area change is 15.6 %.     Left Atrium:  The left atrium is normal in size.     Right Atrium:  The right atrium is normal in size with an indexed volume of 35.55 ml/m² and an indexed area of 10.16 cm²/m².    < end of copied text >     HEMATOLOGY ONCOLOGY CONSULT     Patient is a 53y old  Male who presents with a chief complaint of Pulmonary embolism w/ R heart strain (09 Nov 2023 15:18)      HPI:  53M PMHx of HTN, HLD, and chronic venous insufficiency, transferred from [facility] after presenting for syncopal episode and found to have bilateral PE. Pt states the day prior he had felt dizzy and lightheaded briefly before continuing on with his day. On Tuesday 11/7, he experienced this same sensation while arising from lying down following a nap. Later in the same day while in the parking lot the pt fainted striking the ground face first. The event was unwitnessed and he quickly recovered on his own without any stigmata of a postictal state such as confusion. He subsequently proceeded to drive home and was told by his aunt to go to the ED. At [facility] he was found to have bilateral large PE in Pulm arteries w/ evidence of R heart strain. They transferred him to our services for possible surgical intervention. No fever, cp, sob, abd pain, n/v. He has no Hx of past syncopal episodes nor any of blood clots or Ca. He has a history of venous reflux for the past 7 years and lower extremity edema after being on his feet all day. He has been wearing compression stockings for the past 7 years. He has a very active lifestyle and works two jobs at [employer] and [employer]. He does have an uncle who had a hx of clots but does not know much about his history and can recall no other fam hx of hypercoagulability. He has never had a colonoscopy and reports no changes in his stool consistency or urinary output. No recent history of travel or prior surgery.     ED: Pt found well appearing, tachycardic but not hypoxic or hypotensive. Started on heparin drip.   He was also seen by cardiology and no immediate surgical intervention was deemed necessary. (07 Nov 2023 23:26)       ROS:  Gen: No fever, normal appetite  Eyes: No eye irritation or discharge  ENT: No ear pain, congestion, sore throat  Resp: +SOB  Cardiovascular: +palpitation  Gastroenteric: No nausea/vomiting, diarrhea, constipation  :  No change in urine output; no dysuria  MS: No joint or muscle pain  Skin: No rashes  Neuro: No headache; no abnormal movements  Remainder negative, except as per the HPI    Negative except for:    PAST MEDICAL & SURGICAL HISTORY:  HTN (hypertension)      HLD (hyperlipidemia)      Lower extremity edema      No significant past surgical history        SOCIAL HISTORY:  Works two jobs at [employer] and [employer]  Lives an active lifestyle   Pt has one daughtr (26yo) and one son (32)  FAMILY HISTORY:  Family history of clotting disorder (Uncle)        MEDICATIONS  (STANDING):  apixaban 10 milliGRAM(s) Oral every 12 hours  atorvastatin 20 milliGRAM(s) Oral at bedtime  Biotene Dry Mouth Oral Rinse 15 milliLiter(s) Swish and Spit two times a day  influenza   Vaccine 0.5 milliLiter(s) IntraMuscular once  metoprolol tartrate 12.5 milliGRAM(s) Oral two times a day    MEDICATIONS  (PRN):      Allergies    No Known Allergies    Intolerances        Vital Signs Last 24 Hrs  T(C): 37.3 (10 Nov 2023 11:12), Max: 37.4 (09 Nov 2023 20:30)  T(F): 99.2 (10 Nov 2023 11:12), Max: 99.3 (09 Nov 2023 20:30)  HR: 112 (10 Nov 2023 11:12) (96 - 118)  BP: 146/99 (10 Nov 2023 11:12) (130/93 - 159/97)  BP(mean): --  RR: 18 (10 Nov 2023 11:12) (17 - 18)  SpO2: 99% (10 Nov 2023 11:12) (97% - 100%)    Parameters below as of 10 Nov 2023 11:12  Patient On (Oxygen Delivery Method): room air        PHYSICAL EXAM  General: adult in NAD  HEENT: clear oropharynx, anicteric sclera, pink conjunctiva  Neck: +JVP  CV: normal S1/S2 with no murmur rubs or gallops  Lungs: positive air movement b/l ant lungs,clear to auscultation, no wheezes, no rales  Abdomen: soft non-tender non-distended, no hepatosplenomegaly  Ext: b/l edema   Skin: no rashes and no petechiae  Neuro: alert and oriented X 4, no focal deficits      11-09-23 @ 07:01  -  11-10-23 @ 07:00  --------------------------------------------------------  IN: 0 mL / OUT: 500 mL / NET: -500 mL      LABS:                          11.6   6.35  )-----------( 102      ( 10 Nov 2023 06:16 )             34.6         Mean Cell Volume : 86.5 fl  Mean Cell Hemoglobin : 29.0 pg  Mean Cell Hemoglobin Concentration : 33.5 gm/dL  Auto Neutrophil # : x  Auto Lymphocyte # : x  Auto Monocyte # : x  Auto Eosinophil # : x  Auto Basophil # : x  Auto Neutrophil % : x  Auto Lymphocyte % : x  Auto Monocyte % : x  Auto Eosinophil % : x  Auto Basophil % : x      11-10    136  |  101  |  10  ----------------------------<  109<H>  3.8   |  21<L>  |  0.99    Ca    9.9      10 Nov 2023 06:12  Phos  3.2     11-09  Mg     1.8     11-09        PTT - ( 10 Nov 2023 06:16 )  PTT:29.8 sec    Hypercoagulability workup:  Antithrombin III Antigen (11.09.23 @ 06:45): 21 mg/dL  Antithrombin III Assay with Reflex (11.09.23 @ 06:45): 75  -Factor V Assay: 98:     Onc workup:  Prostate Ca Screen, PSA Total (11.09.23 @ 06:45) 1.40: Method: Roche PSA Electrochemiluminescent Immunoassay   Carcinoembryonic Antigen (11.09.23 @ 06:45) 1.4: Method: Roche Electrochemiluminescence Immuno Assay     BLOOD SMEAR INTERPRETATION:       RADIOLOGY & ADDITIONAL STUDIES:  < from: CT Abdomen and Pelvis w/ IV Cont (11.10.23 @ 05:39) >  IMPRESSION:  Acute pulmonary emboli within partially visualized lower lobes. Recommend   CT chest PE study to assess the full extent of pulmonary embolism    < end of copied text >  < from: VA Duplex Lower Ext Vein Scan, Bilat (11.08.23 @ 09:59) >  IMPRESSION:    There is acute above and below the knee left lower extremity DVT   affecting the femoral and popliteal veins, the posterior tibial peroneal   trunk and the left posterior tibial peroneal and gastrocnemius veins.    There are bilaterally thrombosed, varicose great saphenous veins.    < end of copied text >    < from: TTE Limited W or WO Ultrasound Enhancing Agent (11.07.23 @ 20:09) >  FINDINGS:     Left Ventricle:  The left ventricle was not well visualized. The left ventricular cavity is normal. Left ventricular wall thickness is normal. The interventricular septum is flattened in systole consistent with right ventricular pressure overload. Left ventricular endocardium is not well visualized; however, the left ventricular systolic function appears grossly normal.     Right Ventricle:  The right ventricular cavity is enlarged in size and reduced systolic function. The right ventricular fractional area change is 15.6 %.     Left Atrium:  The left atrium is normal in size.     Right Atrium:  The right atrium is normal in size with an indexed volume of 35.55 ml/m² and an indexed area of 10.16 cm²/m².    < end of copied text >

## 2023-11-10 NOTE — PROGRESS NOTE ADULT - ASSESSMENT
Intermediate high risk PE, no provoking factor. LLE extensive clot burden. Severe pulmonary hypertension on TTE. Not uptodate on age appropriate cancer screening.     On room air. HR improved. Hypertensive. Feels well. Ambulating without difficulty.     -continue DOAC with run-in; patient not interested in additional procedures   -no evidence of iliocaval clot  -no further pan scans needed, he has had head CT, chest CT and abd/pelvis CT; needs outpatient colonoscopy  -limited hypercoaguable work-up in progress, appreciate hematology recs  -will follow-up with vascular medicine as outpatient; our office will arrange

## 2023-11-10 NOTE — PROGRESS NOTE ADULT - TIME BILLING
- Ordering, reviewing, and interpreting labs, testing  - Independently obtaining a review of systems and performing a physical exam  -  discussing plan of care with consultants.  - Counselling and educating patient regarding interpretation of aforementioned items and plan of care.
Chart review, exam, documentation, d/w consultants and team.
Chart review, exam, documentation, d/w consultants and team.

## 2023-11-10 NOTE — DISCHARGE NOTE PROVIDER - CARE PROVIDER_API CALL
[facility]  Cardiovascular Disease  [address] [place] [id number]  Phone: [phone]  Fax: [phone]  Follow Up Time: 2 weeks   [facility]  Cardiovascular Disease  [address] [place] [id number]  Phone: [phone]  Fax: [phone]  Follow Up Time: 2 weeks    Dr Leo Arreguin  PMD  Phone: (   )    -  Fax: (   )    -  Follow Up Time:    [facility]  Cardiovascular Disease  [address] [place] [id number]  Phone: [phone]  Fax: [phone]  Follow Up Time: 2 weeks    Dr Leo Arreguin  PMD  Phone: (   )    -  Fax: (   )    -  Follow Up Time:     Dentist,   Phone: (   )    -  Fax: (   )    -  Follow Up Time:

## 2023-11-10 NOTE — PROGRESS NOTE ADULT - ASSESSMENT
53M PMHx HTN, HLD, Chronic Venous Insufficiency, presents for syncopal episode iso bilateral PE. Transferred from [facility] after found to have large bilat PE in Pulm arteries extending to lobar branches seen on CTA with associated right heart strain.

## 2023-11-10 NOTE — DISCHARGE NOTE PROVIDER - ATTENDING DISCHARGE PHYSICAL EXAMINATION:
Vital Signs Last 24 Hrs  T(C): 37.1 (11 Nov 2023 11:23), Max: 37.7 (10 Nov 2023 16:40)  T(F): 98.7 (11 Nov 2023 11:23), Max: 99.8 (10 Nov 2023 16:40)  HR: 101 (11 Nov 2023 11:23) (86 - 108)  BP: 128/94 (11 Nov 2023 11:23) (128/94 - 147/97)  BP(mean): --  RR: 18 (11 Nov 2023 11:23) (18 - 18)  SpO2: 98% (11 Nov 2023 11:23) (95% - 99%)    Parameters below as of 11 Nov 2023 11:23  Patient On (Oxygen Delivery Method): room air        CONSTITUTIONAL: Well-groomed, in no apparent distress  EYES: No conjunctival or scleral injection, non-icteric;   ENMT: No external nasal lesions  NECK: Trachea midline   RESPIRATORY: Breathing comfortably; lungs CTA without wheeze/rhonchi/rales  CARDIOVASCULAR: +S1S2, RRR, no lower extremity edema  GASTROINTESTINAL: No palpable masses or tenderness, +BS throughout  SKIN: No rashes or ulcers noted  NEUROLOGIC: Sensation intact in LEs b/l to light touch

## 2023-11-10 NOTE — PROGRESS NOTE ADULT - PROBLEM SELECTOR PLAN 1
Pt found to have CTA with large bilat PE in pulm art extending into lobar branches; not hypoxic or hypotensive on adm.     Currently on heparin gtt.     ECHO w R heart strain and severe pulm HTN, LE Doppler- acute above and below the knee left lower extremity DVT affecting the femoral and popliteal veins, the posterior tibial peroneal trunk and the left posterior tibial peroneal and gastrocnemius veins. Bilaterally thrombosed, varicose great saphenous veins.    Underlying reason for PE unclear. Pt has had no cancer screening. PSA and CEA sent which are within normal limits. Outpatient screening Colonoscopy.   Discussed tachycardia with Dr. Alvarez, like 2/2 to PE will have pt walk around unit to monitor exertion  Hypercoagulable workup being done. Discussed with heme team- that pt should have APL labs done before leaving as it might change AC choice to warfarin. Appreciate their recommendations

## 2023-11-10 NOTE — DISCHARGE NOTE PROVIDER - NSDCCPCAREPLAN_GEN_ALL_CORE_FT
PRINCIPAL DISCHARGE DIAGNOSIS  Diagnosis: Pulmonary embolism  Assessment and Plan of Treatment: Follow up with vascular cardiology outpatient.  Take Eliquis as prescribed.  Do not stop taking this medication unless directed to do so by your physician.      SECONDARY DISCHARGE DIAGNOSES  Diagnosis: Left leg DVT  Assessment and Plan of Treatment: Take your Eliquis as prescribed.  Follow up with hematology outpatient to ensure  you complete your hypercoagulable work up.   Walking is encouraged, increase activity as tolerated.  If you develop new leg pain, swelling, and/or redness contact your healthcare provider.  If you develop new chest pain with difficulty breathing, a rapid heart rate and/or a feeling of passing out call emergency medical services 911.      Diagnosis: Hypertension  Assessment and Plan of Treatment: Low salt diet  Activity as tolerated.  Take all medication as prescribed.  Follow up with your medical doctor for routine blood pressure monitoring at your next visit.  Notify your doctor if you have any of the following symptoms:   Dizziness, Lightheadedness, Blurry vision, Headache, Chest pain, Shortness of breath       PRINCIPAL DISCHARGE DIAGNOSIS  Diagnosis: Pulmonary embolism  Assessment and Plan of Treatment: Follow up with vascular cardiology outpatient.  Take Eliquis as prescribed.  Do not stop taking this medication unless directed to do so by your physician.      SECONDARY DISCHARGE DIAGNOSES  Diagnosis: Left leg DVT  Assessment and Plan of Treatment: Take your Eliquis as prescribed.  Follow up with hematology outpatient to ensure  you complete your hypercoagulable work up, they will call you with an appointment.  Walking is encouraged, increase activity as tolerated.  If you develop new leg pain, swelling, and/or redness contact your healthcare provider.  If you develop new chest pain with difficulty breathing, a rapid heart rate and/or a feeling of passing out call emergency medical services 911.      Diagnosis: Hypertension  Assessment and Plan of Treatment: Low salt diet  Activity as tolerated.  Take all medication as prescribed.  Follow up with your medical doctor for routine blood pressure monitoring at your next visit.  Notify your doctor if you have any of the following symptoms:   Dizziness, Lightheadedness, Blurry vision, Headache, Chest pain, Shortness of breath       PRINCIPAL DISCHARGE DIAGNOSIS  Diagnosis: Pulmonary embolism  Assessment and Plan of Treatment: Follow up with vascular cardiology outpatient.  Take Eliquis as prescribed.  Do not stop taking this medication unless directed to do so by your physician.      SECONDARY DISCHARGE DIAGNOSES  Diagnosis: Left leg DVT  Assessment and Plan of Treatment: Take your Eliquis as prescribed.  Follow up with hematology outpatient to ensure  you complete your hypercoagulable work up, they will call you with an appointment.  Walking is encouraged, increase activity as tolerated.  If you develop new leg pain, swelling, and/or redness contact your healthcare provider.  If you develop new chest pain with difficulty breathing, a rapid heart rate and/or a feeling of passing out call emergency medical services 911.      Diagnosis: Hypertension  Assessment and Plan of Treatment: Note that your Indapamide was stopped.    Low salt diet  Activity as tolerated.  Take all medication as prescribed.  Follow up with your medical doctor for routine blood pressure monitoring at your next visit.  Notify your doctor if you have any of the following symptoms:   Dizziness, Lightheadedness, Blurry vision, Headache, Chest pain, Shortness of breath

## 2023-11-10 NOTE — DISCHARGE NOTE PROVIDER - PROVIDER TOKENS
PROVIDER:[TOKEN:[[id number]],FOLLOWUP:[2 weeks]] PROVIDER:[TOKEN:[[id number]],FOLLOWUP:[2 weeks]],FREE:[LAST:[Kallie],FIRST:[Dr Bailey],PHONE:[(   )    -],FAX:[(   )    -],ADDRESS:[PMD]] PROVIDER:[TOKEN:[[id number]],FOLLOWUP:[2 weeks]],FREE:[LAST:[Abdelradlandry],FIRST:[Dr Bailey],PHONE:[(   )    -],FAX:[(   )    -],ADDRESS:[PMD]],FREE:[LAST:[Dentist],PHONE:[(   )    -],FAX:[(   )    -]]

## 2023-11-10 NOTE — PROGRESS NOTE ADULT - PROBLEM SELECTOR PLAN 2
Likely due to PE. CTH, Cervical spine, and maxillofacial (LIJVS): No ICH, or frx. Does have displaced L maxillary incisor tooth- Dental  called.    Seen by EP- ST elevations seen on V1-V4. ST elevations are likely secondary to Right heart strain and unlikely Brugada as they are seen through lead V4. Would obtain Genetic consult with Dr. Chago Rudolph as patient's mother was  age 40 from unclear sudden circumstance- previous hospitalist contacted

## 2023-11-10 NOTE — PROGRESS NOTE ADULT - PROBLEM SELECTOR PLAN 5
DVT ppx: on Heparin    Broken teeth- Dental to evaluate. Unlikely any inpatient intervention.     Will transition to Eliquis if no intervention planned.     D/c planning likely tomorrow if stable, and no further inpatient w/u.    Patient has a different mrn at - medical records called to merge.
DVT ppx: on Elquis  Broken teeth- Dental to follow outpt  Will transition to Eliquis if no intervention planned.   D/c planning when APL labs result   Pt has separate chart from [place] [facility] for other chest head imaging.
DVT ppx: on Heparin  - DASH/TLC diet  - fall precaution   - dispo pending course

## 2023-11-10 NOTE — CONSULT NOTE ADULT - ASSESSMENT
***Note Incomplete**** ***Note Incomplete****    #PE    Hypercoagulability workup:  -Factor V, Antithrombin III negative  -CEA, PSA negative  -CT Abdomen & Pelvis negative for any masses   ***Note Incomplete****    #PE    Hypercoagulability workup:  -Factor V, Antithrombin III negative  -CEA, PSA negative  -CT Abdomen & Pelvis negative for any masses      The prevalence of primary valvular reflux in patients with DVT is significantly higher than expected. Reflux may be considered as a novel risk factor for DVT. Two-thirds of patients with DVT have pre-existent primary chronic venous disease, which is likely to contribute to post-thrombotic morbidity. ***Note Incomplete****    #PE  -  -Factor V and Antithrombin III levels normal   -CEA, PSA negative  -CT Abdomen & Pelvis negative for any masses    Recommendations:  -Possible unprovoked DVT, recommend genetic testing (including Prothrombin Gene Mutation, Factor V Leiden, and JAK2)   -Provoked DVT also still likely given hx of valvular reflux and b/l lower extremity edema   -PanCT to rule out any masses, including CTH given patient's fall on first episode of symptoms    "The prevalence of primary valvular reflux in patients with DVT is significantly higher than expected. Reflux may be considered as a novel risk factor for DVT. Two-thirds of patients with DVT have pre-existent primary chronic venous disease, which is likely to contribute to post-thrombotic morbidity." https://pubmed.ncbi.nlm.nih.gov/86812674/ ***Note Incomplete****    #PE  -Pt found to have CTA with large bilat PE in pulm art extending into lobar branches, acute LLE DVT, w/ R heart strain on ECHO   -Heparin gtt d/c, now on eliquis    -Factor V and Antithrombin III levels normal   -CEA, PSA negative  -CT Abdomen & Pelvis negative for any masses    Recommendations:  -Possible unprovoked DVT, recommend genetic testing (including Prothrombin Gene Mutation, Factor V Leiden, and JAK2)   -Provoked DVT also still likely given hx of valvular reflux and b/l lower extremity edema   -PanCT to rule out any masses, including CTH given patient's fall on first episode of symptoms  -Outpatient colonoscopy recommended     "The prevalence of primary valvular reflux in patients with DVT is significantly higher than expected. Reflux may be considered as a novel risk factor for DVT. Two-thirds of patients with DVT have pre-existent primary chronic venous disease, which is likely to contribute to post-thrombotic morbidity." https://pubmed.ncbi.nlm.nih.gov/13621025/ ***Note Incomplete****  53M PMHx HTN, HLD, Chronic Venous Insufficiency, presents for syncopal episode iso bilateral PE. Transferred from [facility] after found to have large bilat PE in Pulm arteries extending to lobar branches seen on CTA with associated right heart strain.     #PE   -Heparin gtt d/c, now on eliquis    -Factor V and Antithrombin III levels normal   -CEA, PSA negative  -CT Abdomen & Pelvis negative for any masses    Recommendations:  -Possible unprovoked DVT, recommend genetic testing (including Prothrombin Gene Mutation, Factor V Leiden, and JAK2)   -Provoked DVT also still likely given hx of chronic venous insufficiency   -PanCT to rule out any masses, including CTH given patient's fall on first episode of symptoms  -Outpatient colonoscopy recommended     "The prevalence of primary valvular reflux in patients with DVT is significantly higher than expected. Reflux may be considered as a novel risk factor for DVT. Two-thirds of patients with DVT have pre-existent primary chronic venous disease, which is likely to contribute to post-thrombotic morbidity." https://pubmed.ncbi.nlm.nih.gov/16012003/ ***Note Incomplete****  53M PMHx HTN, HLD, Chronic Venous Insufficiency, presents for syncopal episode iso bilateral PE. Transferred from [facility] after found to have large bilat PE in Pulm arteries extending to lobar branches seen on CTA with associated right heart strain.     #PE   -Heparin gtt d/c, now on eliquis    -Factor V and Antithrombin III antigen wnl    -CEA, PSA negative  -CT Abdomen & Pelvis negative for any masses    Recommendations:  -Possible unprovoked DVT, recommend genetic testing (including Prothrombin Gene Mutation, Factor V Leiden, and JAK2)   -Provoked DVT also still likely given hx of chronic venous insufficiency   -PanCT to rule out any masses, including CTH given patient's fall on first episode of symptoms  -Outpatient colonoscopy recommended     "The prevalence of primary valvular reflux in patients with DVT is significantly higher than expected. Reflux may be considered as a novel risk factor for DVT. Two-thirds of patients with DVT have pre-existent primary chronic venous disease, which is likely to contribute to post-thrombotic morbidity." https://pubmed.ncbi.nlm.nih.gov/11698267/ ***Note Incomplete****  53M PMHx HTN, HLD, Chronic Venous Insufficiency, presents for syncopal episode iso bilateral PE. Transferred from [facility] after found to have large bilat PE in Pulm arteries extending to lobar branches seen on CTA with associated right heart strain.     #PE   -Heparin gtt d/c, now on eliquis    -Factor V and Antithrombin III antigen wnl    -CEA, PSA negative  -CT Abdomen & Pelvis negative for any masses    Recommendations:  -Possible unprovoked DVT, recommend genetic testing (including Prothrombin Gene Mutation, Factor V Leiden, and JAK2)  -Recommend checking protein C & protein S levels  -Provoked DVT still possible given hx of chronic venous insufficiency   -Recommend pan CT to rule out any masses, including CTH given patient's fall on first episode of symptoms  -Outpatient colonoscopy recommended     "The prevalence of primary valvular reflux in patients with DVT is significantly higher than expected. Reflux may be considered as a novel risk factor for DVT. Two-thirds of patients with DVT have pre-existent primary chronic venous disease, which is likely to contribute to post-thrombotic morbidity." https://pubmed.ncbi.nlm.nih.gov/54740552/ ***Note Incomplete****  53M PMHx HTN, HLD, Chronic Venous Insufficiency, presents for syncopal episode iso bilateral PE. Transferred from [facility] after found to have large bilat PE in Pulm arteries extending to lobar branches seen on CTA with associated right heart strain.     #PE   -Heparin gtt d/c, now on eliquis    -Factor V and Antithrombin III antigen wnl    -CEA, PSA negative  -CT Abdomen & Pelvis negative for any masses    Recommendations:  -Possible unprovoked DVT, recommend genetic testing (including Prothrombin Gene Mutation, Factor V Leiden, and JAK2)  -Check lupus anticoagulant, anticardiolipin Ab, beta-2 glycoprotein Ab  -Recommend checking protein C & protein S levels  -Provoked DVT still possible given hx of chronic venous insufficiency   -Recommend pan CT to rule out any masses, including CTH given patient's fall on first episode of symptoms  -Outpatient colonoscopy recommended   -Pt to continue eliquis for 3-6 months if hypercoagulability workup negative; lifetime AC if workup is positive    "The prevalence of primary valvular reflux in patients with DVT is significantly higher than expected. Reflux may be considered as a novel risk factor for DVT. Two-thirds of patients with DVT have pre-existent primary chronic venous disease, which is likely to contribute to post-thrombotic morbidity." https://pubmed.ncbi.nlm.nih.gov/85187360/ ***Note Incomplete****  53M PMHx HTN, HLD, Chronic Venous Insufficiency, presents for syncopal episode iso bilateral PE. Transferred from [facility] after found to have large bilat PE in Pulm arteries extending to lobar branches seen on CTA with associated right heart strain.     #PE   -Heparin gtt d/c, now on eliquis    -Factor V and Antithrombin III antigen wnl    -CEA, PSA negative  -CT Abdomen & Pelvis negative for any masses    Recommendations:  -Possible unprovoked DVT, recommend genetic testing (including Prothrombin Gene Mutation, Factor V Leiden, and JAK2)  -Check lupus anticoagulant, f/u anticardiolipin Ab, beta-2 glycoprotein Ab  -Recommend checking protein C & protein S levels  -Provoked DVT still possible given hx of chronic venous insufficiency   -Recommend pan CT to rule out any masses, including CTH given patient's fall on first episode of symptoms  -Outpatient colonoscopy recommended   -Pt to continue eliquis for 3-6 months if hypercoagulability workup negative; lifetime AC if workup is positive    "The prevalence of primary valvular reflux in patients with DVT is significantly higher than expected. Reflux may be considered as a novel risk factor for DVT. Two-thirds of patients with DVT have pre-existent primary chronic venous disease, which is likely to contribute to post-thrombotic morbidity." https://pubmed.ncbi.nlm.nih.gov/71442129/ ***Note Incomplete****  53M PMHx HTN, HLD, Chronic Venous Insufficiency, presents for syncopal episode iso bilateral PE. Transferred from [facility] after found to have large bilat PE in Pulm arteries extending to lobar branches seen on CTA with associated right heart strain.     #PE  -Likely provoked due to hx of chronic venous insufficiency vs. unprovoked given family hx of blood clot  -Heparin gtt d/c, now on eliquis    -Factor V and Antithrombin III antigen wnl    -CEA, PSA negative  -CT Abdomen & Pelvis negative for any masses    Recommendations:  -Possible unprovoked DVT, recommend genetic testing (including Prothrombin Gene Mutation, Factor V Leiden, and JAK2)  -Check lupus anticoagulant, f/u anticardiolipin Ab, beta-2 glycoprotein Ab  -Recommend checking protein C & protein S levels  -Recommend pan CT to rule out any masses, including CTH given patient's fall on first episode of symptoms  -Outpatient colonoscopy recommended   -Pt to continue eliquis for 3-6 months if hypercoagulability workup negative; lifetime AC if workup is positive    "The prevalence of primary valvular reflux in patients with DVT is significantly higher than expected. Reflux may be considered as a novel risk factor for DVT. Two-thirds of patients with DVT have pre-existent primary chronic venous disease, which is likely to contribute to post-thrombotic morbidity." https://pubmed.ncbi.nlm.nih.gov/89959746/ ***Note Incomplete****  53M PMHx HTN, HLD, Chronic Venous Insufficiency, presents for syncopal episode iso bilateral PE. Transferred from [facility] after found to have large bilat PE in Pulm arteries extending to lobar branches seen on CTA with associated right heart strain.     #PE  -Likely provoked due to hx of chronic venous insufficiency vs. unprovoked given family hx of blood clot  -Heparin gtt d/c, now on eliquis    -Factor V and Antithrombin III antigen wnl    -CEA, PSA negative  -CT Abdomen & Pelvis negative for any masses, CTH negative     Recommendations:  -Possible unprovoked DVT, recommend genetic testing (including Prothrombin Gene Mutation, Factor V Leiden, and JAK2)  -Check lupus anticoagulant, f/u anticardiolipin Ab, beta-2 glycoprotein Ab  -Recommend checking protein C & protein S levels  -Outpatient colonoscopy recommended   -Pt to continue eliquis for 3-6 months if hypercoagulability workup negative; lifetime AC if workup is positive    "The prevalence of primary valvular reflux in patients with DVT is significantly higher than expected. Reflux may be considered as a novel risk factor for DVT. Two-thirds of patients with DVT have pre-existent primary chronic venous disease, which is likely to contribute to post-thrombotic morbidity." https://pubmed.ncbi.nlm.nih.gov/19545683/ ***Note Incomplete****  53M PMHx HTN, HLD, Chronic Venous Insufficiency, presents for syncopal episode iso bilateral PE. Transferred from [facility] after found to have large bilat PE in Pulm arteries extending to lobar branches seen on CTA with associated right heart strain.     #PE  -Likely provoked due to hx of chronic venous insufficiency vs. unprovoked given family hx of blood clot  -Heparin gtt d/c, now on eliquis    -Factor V and Antithrombin III antigen wnl    -CEA, PSA negative  -CT Abdomen & Pelvis negative for any masses, CTH negative     Recommendations:  -Possible unprovoked DVT, recommend genetic testing (including Prothrombin Gene Mutation, Factor V Leiden, and JAK2)  -Check lupus anticoagulant, f/u anticardiolipin Ab, beta-2 glycoprotein Ab  -Recommend checking protein C & protein S levels  -Outpatient colonoscopy recommended   -Pt to continue eliquis for 3-6 months if hypercoagulability workup negative; lifetime AC if workup is positive

## 2023-11-10 NOTE — PROGRESS NOTE ADULT - PROBLEM SELECTOR PLAN 3
Home Ramipril and thiazide (Inapamide) held on admission due to right heart strain. SBP 130s.     Tachy- due to PE. Adding low dose Lopressor, monitor.    Glucose elevated on BMP- A1C and FS ordered, monitor.

## 2023-11-10 NOTE — DISCHARGE NOTE PROVIDER - NSDCCONDITION_GEN_ALL_CORE
Received fax refill request for Bupropion from the [facility] pharmacy. Informed pt he has 3 refills at the [facility] in [place] if pt wishes to transfer pharmacies he will have to call the [facility] pharmacy to initiate. Patient verbalized understanding.    Stable

## 2023-11-10 NOTE — DISCHARGE NOTE PROVIDER - NSDCFUSCHEDAPPT_GEN_ALL_CORE_FT
Berkley Rich  [place] Physician [facility]  CARDIOLOGY [address]  Scheduled Appointment: 12/13/2023    Eitan Lund  [facility] Physician [facility]  [facility]  Scheduled Appointment: 12/14/2023

## 2023-11-10 NOTE — DISCHARGE NOTE PROVIDER - NSDCMRMEDTOKEN_GEN_ALL_CORE_FT
atorvastatin 20 mg oral tablet: 1 tab(s) orally once a day  Eliquis Starter Pack for Treatment of DVT and PE 5 mg oral tablet: 2 tab(s) orally every 12 hours please take 2 tablets every 12 hours for 7 days, then take 1 tablet every 12 hours thereafter  indapamide 1.25 mg oral tablet: 1 tab(s) orally once a day  ramipril 5 mg oral tablet: orally   Abdomen soft, non-tender, no guarding. atorvastatin 20 mg oral tablet: 1 tab(s) orally once a day  Eliquis Starter Pack for Treatment of DVT and PE 5 mg oral tablet: 2 tab(s) orally every 12 hours please take 2 tablets every 12 hours for 7 days, then take 1 tablet every 12 hours thereafter  indapamide 1.25 mg oral tablet: 1 tab(s) orally once a day  Metoprolol Tartrate 25 mg oral tablet: 0.5 tab(s) orally 2 times a day  ramipril 5 mg oral tablet: orally   atorvastatin 20 mg oral tablet: 1 tab(s) orally once a day  Eliquis Starter Pack for Treatment of DVT and PE 5 mg oral tablet: 2 tab(s) orally every 12 hours please take 2 tablets every 12 hours for 7 days, then take 1 tablet every 12 hours thereafter  Metoprolol Tartrate 25 mg oral tablet: 0.5 tab(s) orally 2 times a day  ramipril 5 mg oral tablet: orally   atorvastatin 10 mg oral tablet: 1 tab(s) orally  atorvastatin 20 mg oral tablet: 1 tab(s) orally once a day  Eliquis Starter Pack for Treatment of DVT and PE 5 mg oral tablet: 2 tab(s) orally every 12 hours please take 2 tablets every 12 hours for 7 days, then take 1 tablet every 12 hours thereafter  indapamide 1.25 mg oral tablet: 1 tab(s) orally  Metoprolol Tartrate 25 mg oral tablet: 0.5 tab(s) orally 2 times a day  ramipril 5 mg oral capsule: 1 cap(s) orally  ramipril 5 mg oral tablet: orally

## 2023-11-10 NOTE — CONSULT NOTE ADULT - ATTENDING COMMENTS
ECG does have early precordial repolarization changes similar to what is seen with Brugada Syndrome. However the repolarization changes are seen out to V5 which would be unusual for this syndrome. More likely is that these changes are related to the RV strain created by the acute pulmonary embolism and likewise it seems like ventricular arrhythmia is an unlikely etiology for the syncope.  Given his family history of the death of his mother at a young age, genetic testing may be reasonable. That being said, she had an "event" but did make it to the hospital before having her adverse outcome, ie also does not sound like a ventricular arrhythmia.
Intermediate high risk PE, no provoking factor. LLE extensive clot burden. Severe pulmonary hypertension on TTE. Not uptodate on age appropriate cancer screening.     On room air. Tachycardic to 110-102s. Hypertensive. EKG more likely consistent with RV strain 2/2 PE, not brugada.     -therapeutic a/c  -CT venogram to eval for May Thurner and IVC clot  -discussed advanced therapies with the patient; not currently interested in any additional procedures   -age appropriate cancer screening  -hypercoaguable work-up   -please ambulate tomorrow and monitor oxygen saturation
53-yr-old man seen in consult for thrombophilia w/u who presented with SOB found to have B/L extensive PE. He has h/o chronic venous insufficiency and h/o clot in one of his cousins. Agree with fellow's plan on anticoagulation and thrombophilia w/u. Arrange out patient hematology f/u in 3 months.

## 2023-11-10 NOTE — DISCHARGE NOTE PROVIDER - NSDCFUADDAPPT_GEN_ALL_CORE_FT
APPTS ARE READY TO BE MADE: [x] YES    Best Family or Patient Contact (if needed):    Additional Information about above appointments (if needed):    1:   2:   3:     Other comments or requests:    APPTS ARE READY TO BE MADE: [x] YES    Best Family or Patient Contact (if needed):    Additional Information about above appointments (if needed):    1:   2:   3:     Other comments or requests:    Patient advised he is established with a Non [facility] and has an appointment on 12/9/23 but patient did not provide their name.   Patient has prior appointment scheduled with Dr. Vickie Fulton for 12/13 1:45p  Patient has prior appointment scheduled for 12/14 11:00a with Dr. Ludn  Patient advised he saw his PCP last week but declined to confirm the date of appointment.

## 2023-11-11 ENCOUNTER — TRANSCRIPTION ENCOUNTER (OUTPATIENT)
Age: 53
End: 2023-11-11

## 2023-11-11 VITALS
OXYGEN SATURATION: 98 % | RESPIRATION RATE: 18 BRPM | TEMPERATURE: 99 F | DIASTOLIC BLOOD PRESSURE: 94 MMHG | HEART RATE: 101 BPM | SYSTOLIC BLOOD PRESSURE: 128 MMHG

## 2023-11-11 LAB
ANION GAP SERPL CALC-SCNC: 12 MMOL/L — SIGNIFICANT CHANGE UP (ref 5–17)
ANION GAP SERPL CALC-SCNC: 12 MMOL/L — SIGNIFICANT CHANGE UP (ref 5–17)
BUN SERPL-MCNC: 9 MG/DL — SIGNIFICANT CHANGE UP (ref 7–23)
BUN SERPL-MCNC: 9 MG/DL — SIGNIFICANT CHANGE UP (ref 7–23)
CALCIUM SERPL-MCNC: 9.6 MG/DL — SIGNIFICANT CHANGE UP (ref 8.4–10.5)
CALCIUM SERPL-MCNC: 9.6 MG/DL — SIGNIFICANT CHANGE UP (ref 8.4–10.5)
CARDIOLIPIN AB SER-ACNC: NEGATIVE — SIGNIFICANT CHANGE UP
CARDIOLIPIN AB SER-ACNC: NEGATIVE — SIGNIFICANT CHANGE UP
CHLORIDE SERPL-SCNC: 103 MMOL/L — SIGNIFICANT CHANGE UP (ref 96–108)
CHLORIDE SERPL-SCNC: 103 MMOL/L — SIGNIFICANT CHANGE UP (ref 96–108)
CO2 SERPL-SCNC: 22 MMOL/L — SIGNIFICANT CHANGE UP (ref 22–31)
CO2 SERPL-SCNC: 22 MMOL/L — SIGNIFICANT CHANGE UP (ref 22–31)
CREAT SERPL-MCNC: 0.96 MG/DL — SIGNIFICANT CHANGE UP (ref 0.5–1.3)
CREAT SERPL-MCNC: 0.96 MG/DL — SIGNIFICANT CHANGE UP (ref 0.5–1.3)
EGFR: 94 ML/MIN/1.73M2 — SIGNIFICANT CHANGE UP
EGFR: 94 ML/MIN/1.73M2 — SIGNIFICANT CHANGE UP
GLUCOSE SERPL-MCNC: 109 MG/DL — HIGH (ref 70–99)
GLUCOSE SERPL-MCNC: 109 MG/DL — HIGH (ref 70–99)
HCT VFR BLD CALC: 35.6 % — LOW (ref 39–50)
HCT VFR BLD CALC: 35.6 % — LOW (ref 39–50)
HGB BLD-MCNC: 11.5 G/DL — LOW (ref 13–17)
HGB BLD-MCNC: 11.5 G/DL — LOW (ref 13–17)
MCHC RBC-ENTMCNC: 28.2 PG — SIGNIFICANT CHANGE UP (ref 27–34)
MCHC RBC-ENTMCNC: 28.2 PG — SIGNIFICANT CHANGE UP (ref 27–34)
MCHC RBC-ENTMCNC: 32.3 GM/DL — SIGNIFICANT CHANGE UP (ref 32–36)
MCHC RBC-ENTMCNC: 32.3 GM/DL — SIGNIFICANT CHANGE UP (ref 32–36)
MCV RBC AUTO: 87.3 FL — SIGNIFICANT CHANGE UP (ref 80–100)
MCV RBC AUTO: 87.3 FL — SIGNIFICANT CHANGE UP (ref 80–100)
MRSA PCR RESULT.: SIGNIFICANT CHANGE UP
MRSA PCR RESULT.: SIGNIFICANT CHANGE UP
NRBC # BLD: 0 /100 WBCS — SIGNIFICANT CHANGE UP (ref 0–0)
NRBC # BLD: 0 /100 WBCS — SIGNIFICANT CHANGE UP (ref 0–0)
PLATELET # BLD AUTO: 116 K/UL — LOW (ref 150–400)
PLATELET # BLD AUTO: 116 K/UL — LOW (ref 150–400)
POTASSIUM SERPL-MCNC: 4 MMOL/L — SIGNIFICANT CHANGE UP (ref 3.5–5.3)
POTASSIUM SERPL-MCNC: 4 MMOL/L — SIGNIFICANT CHANGE UP (ref 3.5–5.3)
POTASSIUM SERPL-SCNC: 4 MMOL/L — SIGNIFICANT CHANGE UP (ref 3.5–5.3)
POTASSIUM SERPL-SCNC: 4 MMOL/L — SIGNIFICANT CHANGE UP (ref 3.5–5.3)
RBC # BLD: 4.08 M/UL — LOW (ref 4.2–5.8)
RBC # BLD: 4.08 M/UL — LOW (ref 4.2–5.8)
RBC # FLD: 12.8 % — SIGNIFICANT CHANGE UP (ref 10.3–14.5)
RBC # FLD: 12.8 % — SIGNIFICANT CHANGE UP (ref 10.3–14.5)
S AUREUS DNA NOSE QL NAA+PROBE: SIGNIFICANT CHANGE UP
S AUREUS DNA NOSE QL NAA+PROBE: SIGNIFICANT CHANGE UP
SODIUM SERPL-SCNC: 137 MMOL/L — SIGNIFICANT CHANGE UP (ref 135–145)
SODIUM SERPL-SCNC: 137 MMOL/L — SIGNIFICANT CHANGE UP (ref 135–145)
WBC # BLD: 5.83 K/UL — SIGNIFICANT CHANGE UP (ref 3.8–10.5)
WBC # BLD: 5.83 K/UL — SIGNIFICANT CHANGE UP (ref 3.8–10.5)
WBC # FLD AUTO: 5.83 K/UL — SIGNIFICANT CHANGE UP (ref 3.8–10.5)
WBC # FLD AUTO: 5.83 K/UL — SIGNIFICANT CHANGE UP (ref 3.8–10.5)

## 2023-11-11 PROCEDURE — 81241 F5 GENE: CPT

## 2023-11-11 PROCEDURE — 81270 JAK2 GENE: CPT

## 2023-11-11 PROCEDURE — 93306 TTE W/DOPPLER COMPLETE: CPT

## 2023-11-11 PROCEDURE — 85303 CLOT INHIBIT PROT C ACTIVITY: CPT

## 2023-11-11 PROCEDURE — 80048 BASIC METABOLIC PNL TOTAL CA: CPT

## 2023-11-11 PROCEDURE — 86146 BETA-2 GLYCOPROTEIN ANTIBODY: CPT

## 2023-11-11 PROCEDURE — 85027 COMPLETE CBC AUTOMATED: CPT

## 2023-11-11 PROCEDURE — 85301 ANTITHROMBIN III ANTIGEN: CPT

## 2023-11-11 PROCEDURE — 93970 EXTREMITY STUDY: CPT

## 2023-11-11 PROCEDURE — 80061 LIPID PANEL: CPT

## 2023-11-11 PROCEDURE — 82378 CARCINOEMBRYONIC ANTIGEN: CPT

## 2023-11-11 PROCEDURE — 93356 MYOCRD STRAIN IMG SPCKL TRCK: CPT

## 2023-11-11 PROCEDURE — 87641 MR-STAPH DNA AMP PROBE: CPT

## 2023-11-11 PROCEDURE — 85730 THROMBOPLASTIN TIME PARTIAL: CPT

## 2023-11-11 PROCEDURE — 85613 RUSSELL VIPER VENOM DILUTED: CPT

## 2023-11-11 PROCEDURE — 83735 ASSAY OF MAGNESIUM: CPT

## 2023-11-11 PROCEDURE — G0103: CPT

## 2023-11-11 PROCEDURE — 99285 EMERGENCY DEPT VISIT HI MDM: CPT

## 2023-11-11 PROCEDURE — 85610 PROTHROMBIN TIME: CPT

## 2023-11-11 PROCEDURE — 81240 F2 GENE: CPT

## 2023-11-11 PROCEDURE — 82962 GLUCOSE BLOOD TEST: CPT

## 2023-11-11 PROCEDURE — 86147 CARDIOLIPIN ANTIBODY EA IG: CPT

## 2023-11-11 PROCEDURE — 83880 ASSAY OF NATRIURETIC PEPTIDE: CPT

## 2023-11-11 PROCEDURE — 74177 CT ABD & PELVIS W/CONTRAST: CPT

## 2023-11-11 PROCEDURE — 84484 ASSAY OF TROPONIN QUANT: CPT

## 2023-11-11 PROCEDURE — 84100 ASSAY OF PHOSPHORUS: CPT

## 2023-11-11 PROCEDURE — 85025 COMPLETE CBC W/AUTO DIFF WBC: CPT

## 2023-11-11 PROCEDURE — 83036 HEMOGLOBIN GLYCOSYLATED A1C: CPT

## 2023-11-11 PROCEDURE — 71045 X-RAY EXAM CHEST 1 VIEW: CPT

## 2023-11-11 PROCEDURE — 85306 CLOT INHIBIT PROT S FREE: CPT

## 2023-11-11 PROCEDURE — 99239 HOSP IP/OBS DSCHRG MGMT >30: CPT

## 2023-11-11 PROCEDURE — 87640 STAPH A DNA AMP PROBE: CPT

## 2023-11-11 PROCEDURE — G0452: CPT | Mod: 26

## 2023-11-11 PROCEDURE — 85220 BLOOC CLOT FACTOR V TEST: CPT

## 2023-11-11 PROCEDURE — 85300 ANTITHROMBIN III ACTIVITY: CPT

## 2023-11-11 PROCEDURE — 36415 COLL VENOUS BLD VENIPUNCTURE: CPT

## 2023-11-11 PROCEDURE — 80053 COMPREHEN METABOLIC PANEL: CPT

## 2023-11-11 RX ORDER — METOPROLOL TARTRATE 50 MG
0.5 TABLET ORAL
Qty: 30 | Refills: 0
Start: 2023-11-11 | End: 2023-12-10

## 2023-11-11 RX ADMIN — CHLORHEXIDINE GLUCONATE 1 APPLICATION(S): 213 SOLUTION TOPICAL at 04:53

## 2023-11-11 RX ADMIN — Medication 15 MILLILITER(S): at 04:52

## 2023-11-11 RX ADMIN — Medication 12.5 MILLIGRAM(S): at 04:52

## 2023-11-11 RX ADMIN — APIXABAN 10 MILLIGRAM(S): 2.5 TABLET, FILM COATED ORAL at 05:52

## 2023-11-11 NOTE — DISCHARGE NOTE NURSING/CASE MANAGEMENT/SOCIAL WORK - NSDCPEFALRISK_GEN_ALL_CORE
For information on Fall & Injury Prevention, visit: [email or web address] OR  [email or web address] OR  https://www.cdc.gov/steadi/patient.html

## 2023-11-11 NOTE — DISCHARGE NOTE NURSING/CASE MANAGEMENT/SOCIAL WORK - PATIENT PORTAL LINK FT
You can access the FollowMyHealth Patient Portal offered by [facility] by registering at the following website: [email or web address]. By joining [email or web address]’s FollowMyHealth portal, you will also be able to view your health information using other applications (apps) compatible with our system. Patient is awake and alert. Affect is bright. Speech is fluent. Reports that she feels good, is aware that bp was low and is better today. TP is coherent.  No delusions. No perceptual disturbance. No PMR. Fair insight. No suicidal ideations.

## 2023-11-11 NOTE — PROGRESS NOTE ADULT - PROVIDER SPECIALTY LIST ADULT
Cardiology
Dental
Electrophysiology
Cardiology
Hospitalist
Vascular Cardiology
Electrophysiology
Vascular Cardiology
Hospitalist
Internal Medicine

## 2023-11-11 NOTE — PROGRESS NOTE ADULT - REASON FOR ADMISSION
Pulmonary embolism w/ R heart strain

## 2023-11-11 NOTE — PROGRESS NOTE ADULT - SUBJECTIVE AND OBJECTIVE BOX
DATE OF SERVICE: 11-11-23 @ 12:47    Patient is a 53y old  Male who presents with a chief complaint of Pulmonary embolism w/ R heart strain (10 Nov 2023 16:01)      INTERVAL HISTORY:     REVIEW OF SYSTEMS:  CONSTITUTIONAL: No weakness  EYES/ENT: No visual changes;  No throat pain   NECK: No pain or stiffness  RESPIRATORY: No cough, wheezing; No shortness of breath  CARDIOVASCULAR: No chest pain or palpitations  GASTROINTESTINAL: No abdominal  pain. No nausea, vomiting, or hematemesis  GENITOURINARY: No dysuria, frequency or hematuria  NEUROLOGICAL: No stroke like symptoms  SKIN: No rashes    TELEMETRY Personally reviewed: SR/ST 70-110s  	  MEDICATIONS:  metoprolol tartrate 12.5 milliGRAM(s) Oral two times a day        PHYSICAL EXAM:  T(C): 37.1 (11-11-23 @ 11:23), Max: 37.7 (11-10-23 @ 16:40)  HR: 101 (11-11-23 @ 11:23) (86 - 108)  BP: 128/94 (11-11-23 @ 11:23) (128/94 - 147/97)  RR: 18 (11-11-23 @ 11:23) (18 - 18)  SpO2: 98% (11-11-23 @ 11:23) (95% - 99%)  Wt(kg): --  I&O's Summary    10 Nov 2023 07:01  -  11 Nov 2023 07:00  --------------------------------------------------------  IN: 720 mL / OUT: 0 mL / NET: 720 mL          Appearance: In no distress	  HEENT:    PERRL, EOMI	  Cardiovascular:  S1 S2, No JVD  Respiratory: Lungs clear to auscultation	  Gastrointestinal:  Soft, Non-tender, + BS	  Vascularature:  No edema of LE  Psychiatric: Appropriate affect   Neuro: no acute focal deficits                               11.5   5.83  )-----------( 116      ( 11 Nov 2023 06:44 )             35.6     11-11    137  |  103  |  9   ----------------------------<  109<H>  4.0   |  22  |  0.96    Ca    9.6      11 Nov 2023 06:44          Labs personally reviewed      ASSESSMENT/PLAN: 	  53M PMHx HTN, HLD, Chronic Venous Insufficiency, presents for syncopal episode iso bilateral PE. Transferred from [facility] after found to have large bilat PE in Pulm arteries extending to lobar branches seen on CTA with associated right heart strain.       Problem/Plan - 1:  ·  Problem: Pulmonary embolism.   ·  Plan: Large bilat PE in pulm art extending into lobar branches;   -  Doppler- acute above and below the knee left lower extremity DVT affecting the femoral and popliteal veins, the posterior tibial peroneal trunk and the left posterior tibial peroneal and gastrocnemius veins. Bilaterally thrombosed, varicose great saphenous veins.  - not hypoxic or hypotensive, comfortable on RA  - TTE w R heart strain and severe pulm HTN,  - Pt declines procedures, vascular interventional cards recs appreciated  - Mild compensatory physiologic sinus tach  - Transition to Eliquis 10mg BID  - Hypercoag work up by heme ongoing  - 11/10 pt ambulated around entire unit comfortably     Problem/Plan - 2:  ·  Problem: Syncope.   ·  Plan: Likely due to PE.   - no further work up     Problem/Plan - 3:  ·  Problem: HTN (hypertension).   ·  Plan: Home Ramipril and thiazide (Inapamide) held on admission due to right heart strain. SBP 130s.     Problem/Plan - 4:  ·  Problem: HLD (hyperlipidemia).   ·  Plan: Home atorvastatin.    Problem/Plan - 5:  ·  Problem: Abnormal EKG  ·  Plan: EP recs appreciated ST elevations are likely secondary to Right heart strain and unlikely Brugada   - Genetic consult with Dr. Chago Rudolph as outpatient          Azael Jerome, JENNIFER Alvarez,  [facility]  Cardiovascular Medicine  [address], [address]  Available through call or text on [employer]  Office: [phone]

## 2023-11-11 NOTE — PROVIDER CONTACT NOTE (OTHER) - BACKGROUND
pt admitted s/p tx from [facility] for PE. Hx of HTN
Dx: B/L PE with R heart strain   Hx: HTN, HLD, DVT

## 2023-11-11 NOTE — PROVIDER CONTACT NOTE (OTHER) - ACTION/TREATMENT ORDERED:
give AM PO 12.5 lopressor now
MD aware, no new orders. Continue to monitor pt and notify of any changes

## 2023-11-13 LAB
B2 GLYCOPROT1 AB SER QL: NEGATIVE — SIGNIFICANT CHANGE UP
B2 GLYCOPROT1 AB SER QL: NEGATIVE — SIGNIFICANT CHANGE UP
DRVVT RATIO: 0.76 RATIO — SIGNIFICANT CHANGE UP (ref 0–1.21)
DRVVT RATIO: 0.76 RATIO — SIGNIFICANT CHANGE UP (ref 0–1.21)
DRVVT SCREEN TO CONFIRM RATIO: SIGNIFICANT CHANGE UP
DRVVT SCREEN TO CONFIRM RATIO: SIGNIFICANT CHANGE UP
NORMALIZED SCT PPP-RTO: 0.95 RATIO — SIGNIFICANT CHANGE UP (ref 0–1.16)
NORMALIZED SCT PPP-RTO: 0.95 RATIO — SIGNIFICANT CHANGE UP (ref 0–1.16)
NORMALIZED SCT PPP-RTO: SIGNIFICANT CHANGE UP
NORMALIZED SCT PPP-RTO: SIGNIFICANT CHANGE UP
PROT C ACT/NOR PPP: 110 % — SIGNIFICANT CHANGE UP (ref 74–150)
PROT C ACT/NOR PPP: 110 % — SIGNIFICANT CHANGE UP (ref 74–150)

## 2023-11-14 LAB
CARDIOLIPIN AB SER-ACNC: NEGATIVE — SIGNIFICANT CHANGE UP
CARDIOLIPIN AB SER-ACNC: NEGATIVE — SIGNIFICANT CHANGE UP

## 2023-11-15 PROBLEM — I10 ESSENTIAL (PRIMARY) HYPERTENSION: Chronic | Status: ACTIVE | Noted: 2023-11-07

## 2023-11-15 RX ORDER — RAMIPRIL 5 MG
0 CAPSULE ORAL
Refills: 0 | DISCHARGE

## 2023-11-15 RX ORDER — INDAPAMIDE 1.25 MG
1 TABLET ORAL
Refills: 0 | DISCHARGE

## 2023-11-15 RX ORDER — ATORVASTATIN CALCIUM 80 MG/1
1 TABLET, FILM COATED ORAL
Refills: 0 | DISCHARGE

## 2023-11-16 LAB
PTR INTERPRETATION: SIGNIFICANT CHANGE UP
PTR INTERPRETATION: SIGNIFICANT CHANGE UP

## 2023-11-17 PROBLEM — E78.5 HYPERLIPIDEMIA, UNSPECIFIED: Chronic | Status: ACTIVE | Noted: 2023-11-07

## 2023-11-17 PROBLEM — Z00.00 ENCOUNTER FOR PREVENTIVE HEALTH EXAMINATION: Status: ACTIVE | Noted: 2023-11-17

## 2023-11-17 PROBLEM — I10 ESSENTIAL (PRIMARY) HYPERTENSION: Chronic | Status: ACTIVE | Noted: 2023-11-07

## 2023-11-17 PROBLEM — R60.0 LOCALIZED EDEMA: Chronic | Status: ACTIVE | Noted: 2023-11-08

## 2023-11-17 LAB
JAK2 P.V617F BLD/T QL: SIGNIFICANT CHANGE UP
JAK2 P.V617F BLD/T QL: SIGNIFICANT CHANGE UP

## 2023-11-21 LAB
PROT S FREE PPP-ACNC: 47 % — LOW (ref 63–140)
PROT S FREE PPP-ACNC: 47 % — LOW (ref 63–140)

## 2023-11-24 NOTE — CHART NOTE - NSCHARTNOTEFT_GEN_A_CORE
Left 1 message for patient in regards to follow up care with callback information.
Called by the nurse to report that abnormal doppler results- Acute above and below the knee left lower extremity DVT affecting the femoral and popliteal veins, the posterior tibial peroneal trunk and the left posterior tibial peroneal and gastrocnemius veins. Continue Heparin gtt as ordered.     [facility] paged x 3- no response
Left another message for patient to call back regarding follow up referrals

## 2023-12-12 RX ORDER — APIXABAN 5 MG/1
5 TABLET, FILM COATED ORAL
Qty: 30 | Refills: 0 | Status: ACTIVE | COMMUNITY

## 2023-12-13 ENCOUNTER — APPOINTMENT (OUTPATIENT)
Dept: CARDIOLOGY | Facility: CLINIC | Age: 53
End: 2023-12-13
Payer: COMMERCIAL

## 2023-12-13 ENCOUNTER — OUTPATIENT (OUTPATIENT)
Dept: OUTPATIENT SERVICES | Facility: HOSPITAL | Age: 53
LOS: 1 days | Discharge: HOME | End: 2023-12-13

## 2023-12-13 VITALS
SYSTOLIC BLOOD PRESSURE: 205 MMHG | WEIGHT: 198 LBS | HEIGHT: 72 IN | HEART RATE: 76 BPM | DIASTOLIC BLOOD PRESSURE: 121 MMHG | BODY MASS INDEX: 26.82 KG/M2 | OXYGEN SATURATION: 100 %

## 2023-12-13 DIAGNOSIS — Z82.49 FAMILY HISTORY OF ISCHEMIC HEART DISEASE AND OTHER DISEASES OF THE CIRCULATORY SYSTEM: ICD-10-CM

## 2023-12-13 DIAGNOSIS — D64.9 ANEMIA, UNSPECIFIED: ICD-10-CM

## 2023-12-13 DIAGNOSIS — Z78.9 OTHER SPECIFIED HEALTH STATUS: ICD-10-CM

## 2023-12-13 PROCEDURE — 99205 OFFICE O/P NEW HI 60 MIN: CPT

## 2023-12-13 PROCEDURE — 93000 ELECTROCARDIOGRAM COMPLETE: CPT

## 2023-12-13 RX ORDER — RAMIPRIL 2.5 MG/1
2.5 CAPSULE ORAL
Refills: 0 | Status: ACTIVE | COMMUNITY

## 2023-12-13 RX ORDER — ATORVASTATIN CALCIUM 20 MG/1
20 TABLET, FILM COATED ORAL
Qty: 90 | Refills: 2 | Status: ACTIVE | COMMUNITY

## 2023-12-13 RX ORDER — METOPROLOL TARTRATE 25 MG/1
25 TABLET, FILM COATED ORAL
Qty: 90 | Refills: 3 | Status: ACTIVE | COMMUNITY

## 2023-12-14 ENCOUNTER — APPOINTMENT (OUTPATIENT)
Dept: HEMATOLOGY ONCOLOGY | Facility: CLINIC | Age: 53
End: 2023-12-14
Payer: COMMERCIAL

## 2023-12-14 ENCOUNTER — RESULT REVIEW (OUTPATIENT)
Age: 53
End: 2023-12-14

## 2023-12-14 ENCOUNTER — NON-APPOINTMENT (OUTPATIENT)
Age: 53
End: 2023-12-14

## 2023-12-14 VITALS
WEIGHT: 201.72 LBS | TEMPERATURE: 97.9 F | RESPIRATION RATE: 17 BRPM | HEART RATE: 86 BPM | DIASTOLIC BLOOD PRESSURE: 101 MMHG | HEIGHT: 71.85 IN | BODY MASS INDEX: 27.62 KG/M2 | SYSTOLIC BLOOD PRESSURE: 178 MMHG | OXYGEN SATURATION: 96 %

## 2023-12-14 DIAGNOSIS — Z86.79 PERSONAL HISTORY OF OTHER DISEASES OF THE CIRCULATORY SYSTEM: ICD-10-CM

## 2023-12-14 DIAGNOSIS — D68.59 OTHER PRIMARY THROMBOPHILIA: ICD-10-CM

## 2023-12-14 DIAGNOSIS — Z79.01 LONG TERM (CURRENT) USE OF ANTICOAGULANTS: ICD-10-CM

## 2023-12-14 LAB
BASOPHILS # BLD AUTO: 0.03 K/UL — SIGNIFICANT CHANGE UP (ref 0–0.2)
BASOPHILS # BLD AUTO: 0.03 K/UL — SIGNIFICANT CHANGE UP (ref 0–0.2)
BASOPHILS NFR BLD AUTO: 0.7 % — SIGNIFICANT CHANGE UP (ref 0–2)
BASOPHILS NFR BLD AUTO: 0.7 % — SIGNIFICANT CHANGE UP (ref 0–2)
EOSINOPHIL # BLD AUTO: 0.13 K/UL — SIGNIFICANT CHANGE UP (ref 0–0.5)
EOSINOPHIL # BLD AUTO: 0.13 K/UL — SIGNIFICANT CHANGE UP (ref 0–0.5)
EOSINOPHIL NFR BLD AUTO: 3 % — SIGNIFICANT CHANGE UP (ref 0–6)
EOSINOPHIL NFR BLD AUTO: 3 % — SIGNIFICANT CHANGE UP (ref 0–6)
HCT VFR BLD CALC: 39.1 % — SIGNIFICANT CHANGE UP (ref 39–50)
HCT VFR BLD CALC: 39.1 % — SIGNIFICANT CHANGE UP (ref 39–50)
HGB BLD-MCNC: 12.6 G/DL — LOW (ref 13–17)
HGB BLD-MCNC: 12.6 G/DL — LOW (ref 13–17)
IMM GRANULOCYTES NFR BLD AUTO: 0 % — SIGNIFICANT CHANGE UP (ref 0–0.9)
IMM GRANULOCYTES NFR BLD AUTO: 0 % — SIGNIFICANT CHANGE UP (ref 0–0.9)
LYMPHOCYTES # BLD AUTO: 0.91 K/UL — LOW (ref 1–3.3)
LYMPHOCYTES # BLD AUTO: 0.91 K/UL — LOW (ref 1–3.3)
LYMPHOCYTES # BLD AUTO: 21.3 % — SIGNIFICANT CHANGE UP (ref 13–44)
LYMPHOCYTES # BLD AUTO: 21.3 % — SIGNIFICANT CHANGE UP (ref 13–44)
MCHC RBC-ENTMCNC: 28.2 PG — SIGNIFICANT CHANGE UP (ref 27–34)
MCHC RBC-ENTMCNC: 28.2 PG — SIGNIFICANT CHANGE UP (ref 27–34)
MCHC RBC-ENTMCNC: 32.2 G/DL — SIGNIFICANT CHANGE UP (ref 32–36)
MCHC RBC-ENTMCNC: 32.2 G/DL — SIGNIFICANT CHANGE UP (ref 32–36)
MCV RBC AUTO: 87.5 FL — SIGNIFICANT CHANGE UP (ref 80–100)
MCV RBC AUTO: 87.5 FL — SIGNIFICANT CHANGE UP (ref 80–100)
MONOCYTES # BLD AUTO: 0.25 K/UL — SIGNIFICANT CHANGE UP (ref 0–0.9)
MONOCYTES # BLD AUTO: 0.25 K/UL — SIGNIFICANT CHANGE UP (ref 0–0.9)
MONOCYTES NFR BLD AUTO: 5.9 % — SIGNIFICANT CHANGE UP (ref 2–14)
MONOCYTES NFR BLD AUTO: 5.9 % — SIGNIFICANT CHANGE UP (ref 2–14)
NEUTROPHILS # BLD AUTO: 2.95 K/UL — SIGNIFICANT CHANGE UP (ref 1.8–7.4)
NEUTROPHILS # BLD AUTO: 2.95 K/UL — SIGNIFICANT CHANGE UP (ref 1.8–7.4)
NEUTROPHILS NFR BLD AUTO: 69.1 % — SIGNIFICANT CHANGE UP (ref 43–77)
NEUTROPHILS NFR BLD AUTO: 69.1 % — SIGNIFICANT CHANGE UP (ref 43–77)
NRBC # BLD: 0 /100 WBCS — SIGNIFICANT CHANGE UP (ref 0–0)
NRBC # BLD: 0 /100 WBCS — SIGNIFICANT CHANGE UP (ref 0–0)
PLATELET # BLD AUTO: 143 K/UL — LOW (ref 150–400)
PLATELET # BLD AUTO: 143 K/UL — LOW (ref 150–400)
RBC # BLD: 4.47 M/UL — SIGNIFICANT CHANGE UP (ref 4.2–5.8)
RBC # BLD: 4.47 M/UL — SIGNIFICANT CHANGE UP (ref 4.2–5.8)
RBC # FLD: 12.6 % — SIGNIFICANT CHANGE UP (ref 10.3–14.5)
RBC # FLD: 12.6 % — SIGNIFICANT CHANGE UP (ref 10.3–14.5)
WBC # BLD: 4.27 K/UL — SIGNIFICANT CHANGE UP (ref 3.8–10.5)
WBC # BLD: 4.27 K/UL — SIGNIFICANT CHANGE UP (ref 3.8–10.5)
WBC # FLD AUTO: 4.27 K/UL — SIGNIFICANT CHANGE UP (ref 3.8–10.5)
WBC # FLD AUTO: 4.27 K/UL — SIGNIFICANT CHANGE UP (ref 3.8–10.5)

## 2023-12-14 PROCEDURE — 99205 OFFICE O/P NEW HI 60 MIN: CPT

## 2023-12-14 NOTE — HISTORY OF PRESENT ILLNESS
[de-identified] : 53-year-old Mr. NI is seen in consult for hypercoagulable w/u. On 11/5/2023 passed out in the parking lot. He was having chest pain and lightheadedness. He was taken to the hospital and was found to have LLE DVT and PE in the left lung. The patient spent 4 days in the hospital and was discharged with Eliquis which he has been compliant to. His symptoms have resolved. This is first clot. His uncle has had a DVT at his 80s, otherwise, no relevant family history.

## 2023-12-14 NOTE — RESULTS/DATA
[FreeTextEntry1] : 12/14/2023 Hospital labs reviewd. Thrombophilia w/u done are as below:  Protein C : mildly low Protein S normal   ATIII mildly low (78)  b2m Ab: Normal  LA: Negative FVL: Negative PGM: Negative JAK2; Negative

## 2023-12-14 NOTE — ASSESSMENT
[FreeTextEntry1] : #Intermediate high risk PE,  unclear provoking factor. Long standing venous insufficiency and sits for long periods of time at his job. Not uptodate with his age appropriate cancer screening. Initial limited hypercoag work-up unremarkable. No compression on CT venogram.   -continue DOAC -f/u hematology -TTE and venous duplex at 3 month rajiv -reviewed signs and symptoms to seek emergency care; if emergency procedure arises that he needs to stop the blood thinner for he should notify the office  -f/u at 3 month rajiv after above imaging is completed -BP monitoring at home given initial elevated pressures in the office; on repeat 160/80 mmHg

## 2023-12-14 NOTE — REVIEW OF SYSTEMS
[SOB] : no shortness of breath [Dyspnea on exertion] : not dyspnea during exertion [Lower Ext Edema] : no extremity edema [Leg Claudication] : no intermittent leg claudication [Orthopnea] : no orthopnea [Syncope] : no syncope [Change In Color Of Skin] : change in skin color [Negative] : Heme/Lymph [de-identified] : +varicose veins

## 2023-12-14 NOTE — PHYSICAL EXAM
[Normal Conjunctiva] : the conjunctiva exhibited no abnormalities [No Oral Cyanosis] : no oral cyanosis [Heart Rate And Rhythm] : heart rate and rhythm were normal [Heart Sounds] : normal S1 and S2 [Murmurs] : no murmurs present [Arterial Pulses Normal] : the arterial pulses were normal [Abdomen Soft] : soft [Abdomen Tenderness] : non-tender [Abnormal Walk] : normal gait [Cyanosis, Localized] : no localized cyanosis [No Skin Ulcers] : no skin ulcer [FreeTextEntry1] : skin changes consistent with long standing venous insufficiency  [Oriented To Time, Place, And Person] : oriented to person, place, and time [Impaired Insight] : insight and judgment were intact [Affect] : the affect was normal

## 2023-12-14 NOTE — REASON FOR VISIT
[FreeTextEntry1] : 52 yo M with PMHx of venous insufficiency, recent intermediate high risk PE who is referred to vascular medicine for evaluation.   Hospital course: intermediate high risk PE, not interested in invasive procedure. Severe pHTN on TTE. Heparin gtt to DOAC with run-in. CT scan with no masses or lymph nodes. No compression of veins. Venous duplex with acute above and below the knee left lower extremity DVT  affecting the femoral and popliteal veins, the posterior tibial peroneal  trunk and the left posterior tibial peroneal and gastrocnemius veins. There are bilaterally thrombosed, varicose great saphenous veins. Labs: Factor V and Antithrombin III antigen wnl; Prothrombin and Factor V Leiden gene mutations negative. CEA, PSA negative. Anticardiolipin negative, silica clotting time and DRVVT ratio negative. Hematology follow-up tomorrow.   Since d/c doing well. No symptoms at rest or with exertion. Going up flights of stairs without issues. No CP, SOB, palpitations, or syncope. No issues with bleeding. Has the stool test for cancer screening; discussed that he might need a colonoscopy anyways. Discussed interval imaging for RV function and LE DVTs. No desaturation with ambulation.   Swelling controlled with compression.

## 2023-12-14 NOTE — ASSESSMENT
[FreeTextEntry1] : 53-year-old Mr. NI is seen for new LLE DVT and PE. It's not clear if the episode is provoked or unprovoked. He has no family history. of VTE. The thrombophilia w/u done in [facility] are normal or negative except protein C that was done in the acute clot phase. Will repeat the positive test(s) and the ones which were not done.   [] PE, DVT (LLE)  - Unclear if provoked - On Eliquis now'   - Continue Eliquis for 3-6 months  - Complete the thrombophilia w/u  (Repeat protein C and ATIII, get B2GP Ab) - D-dimer today  - RTC in 3 months

## 2023-12-15 ENCOUNTER — NON-APPOINTMENT (OUTPATIENT)
Age: 53
End: 2023-12-15

## 2023-12-16 LAB
ALBUMIN SERPL ELPH-MCNC: 4.6 G/DL
ALP BLD-CCNC: 95 U/L
ALT SERPL-CCNC: 15 U/L
ANION GAP SERPL CALC-SCNC: 10 MMOL/L
APTT BLD: 30.6 SEC
AST SERPL-CCNC: 18 U/L
BILIRUB SERPL-MCNC: 0.7 MG/DL
BUN SERPL-MCNC: 11 MG/DL
CALCIUM SERPL-MCNC: 10.1 MG/DL
CARDIOLIPIN IGM SER-MCNC: 5.5 MPL
CARDIOLIPIN IGM SER-MCNC: <5 GPL
CHLORIDE SERPL-SCNC: 104 MMOL/L
CO2 SERPL-SCNC: 24 MMOL/L
CREAT SERPL-MCNC: 0.94 MG/DL
DEPRECATED D DIMER PPP IA-ACNC: <150 NG/ML DDU
EGFR: 96 ML/MIN/1.73M2
GLUCOSE SERPL-MCNC: 98 MG/DL
INR PPP: 1.26 RATIO
POTASSIUM SERPL-SCNC: 4.2 MMOL/L
PROT C PPP CHRO-ACNC: 108 %
PROT SERPL-MCNC: 7.5 G/DL
PT BLD: 14.1 SEC
SODIUM SERPL-SCNC: 139 MMOL/L

## 2024-01-02 ENCOUNTER — APPOINTMENT (OUTPATIENT)
Dept: CARDIOLOGY | Facility: CLINIC | Age: 54
End: 2024-01-02

## 2024-02-07 ENCOUNTER — NON-APPOINTMENT (OUTPATIENT)
Age: 54
End: 2024-02-07

## 2024-02-07 ENCOUNTER — APPOINTMENT (OUTPATIENT)
Dept: CARDIOLOGY | Facility: CLINIC | Age: 54
End: 2024-02-07
Payer: COMMERCIAL

## 2024-02-07 VITALS
HEART RATE: 90 BPM | WEIGHT: 201 LBS | BODY MASS INDEX: 27.52 KG/M2 | SYSTOLIC BLOOD PRESSURE: 140 MMHG | OXYGEN SATURATION: 96 % | DIASTOLIC BLOOD PRESSURE: 95 MMHG | HEIGHT: 71.85 IN

## 2024-02-07 DIAGNOSIS — R42 DIZZINESS AND GIDDINESS: ICD-10-CM

## 2024-02-07 PROCEDURE — 93306 TTE W/DOPPLER COMPLETE: CPT

## 2024-02-07 PROCEDURE — 93000 ELECTROCARDIOGRAM COMPLETE: CPT

## 2024-02-07 PROCEDURE — 99215 OFFICE O/P EST HI 40 MIN: CPT

## 2024-02-08 NOTE — REVIEW OF SYSTEMS
[Blurry Vision] : blurred vision [SOB] : no shortness of breath [Dyspnea on exertion] : not dyspnea during exertion [Lower Ext Edema] : no extremity edema [Leg Claudication] : no intermittent leg claudication [Orthopnea] : no orthopnea [Syncope] : no syncope [Change In Color Of Skin] : change in skin color [Dizziness] : dizziness [Negative] : Heme/Lymph [de-identified] : +varicose veins

## 2024-02-08 NOTE — REASON FOR VISIT
[Follow-Up - Clinic] : a clinic follow-up of [Peripheral Vascular Disease] : peripheral vascular disease [FreeTextEntry1] : 55 yo M with PMHx of venous insufficiency, recent intermediate high risk PE, LLE DVT, medically managed, on DOAC, here for vascular medicine follow-up.   Saw hematology, Dr. Lund.  TTE today at [facility].  The thrombophilia w/u done in the hospital are normal or negative except protein C that was done in the acute clot phase. Will repeat the positive test(s) and the ones which were not done. Pro C negative. ACL negative, d-dimer negative.  BP still high at home. Today's numbers are better than his home readings? No issues with blood thinner.  Still waiting results from stool sent for cancer screening. No prior colonoscopy.  Long standing venous insufficiency. No recent venous reflux testing.  Having issues with vision at work when he looks down at the computer. Some issues with looking down and looking to the side while driving. ONly symptoms when he is sitting down. Not with standing. Can also happen when laying down. He has noticed his vision has gotten worse. No recent vision testing. No syncope or presyncope.   Relevant hx: Hospital course: intermediate high risk PE, not interested in invasive procedure. Severe pHTN on TTE. Heparin gtt to DOAC with run-in. CT scan with no masses or lymph nodes. No compression of veins. Venous duplex with acute above and below the knee left lower extremity DVT  affecting the femoral and popliteal veins, the posterior tibial peroneal  trunk and the left posterior tibial peroneal and gastrocnemius veins. There are bilaterally thrombosed, varicose great saphenous veins. Labs: Factor V and Antithrombin III antigen wnl; Prothrombin and Factor V Leiden gene mutations negative. CEA, PSA negative. Anticardiolipin negative, silica clotting time and DRVVT ratio negative. Hematology follow-up tomorrow.   Since d/c doing well. No symptoms at rest or with exertion. Going up flights of stairs without issues. No CP, SOB, palpitations, or syncope. No issues with bleeding. Has the stool test for cancer screening; discussed that he might need a colonoscopy anyways. Discussed interval imaging for RV function and LE DVTs. No desaturation with ambulation.   Swelling controlled with compression.

## 2024-02-08 NOTE — ASSESSMENT
[FreeTextEntry1] : #Intermediate high risk PE, unclear provoking factor. Long standing venous insufficiency and sits for long periods of time at his job. Not uptodate with his age appropriate cancer screening. Initial limited hypercoag work-up unremarkable. No compression on CT venogram.  -continue DOAC -f/u hematology -TTE done today, f/u -will get full venous duplex with reflux study; continue compression therapy  -reviewed signs and symptoms to seek emergency care; if emergency procedure arises that he needs to stop the blood thinner for he should notify the office -f/u at 3 month   #HTN, vision changes -24 hour ambulatory BP monitor to correlate home BPs and office BPs -optho referral -vision changes that are positional, if other work up is negative will send to ENT

## 2024-02-28 ENCOUNTER — APPOINTMENT (OUTPATIENT)
Dept: CARDIOLOGY | Facility: CLINIC | Age: 54
End: 2024-02-28
Payer: COMMERCIAL

## 2024-02-28 DIAGNOSIS — R03.0 ELEVATED BLOOD-PRESSURE READING, W/OUT DIAGNOSIS OF HYPERTENSION: ICD-10-CM

## 2024-02-28 PROCEDURE — 93784 AMBL BP MNTR W/SOFTWARE: CPT

## 2024-03-18 ENCOUNTER — OUTPATIENT (OUTPATIENT)
Dept: OUTPATIENT SERVICES | Facility: HOSPITAL | Age: 54
LOS: 1 days | Discharge: HOME | End: 2024-03-18

## 2024-03-18 DIAGNOSIS — D46.9 MYELODYSPLASTIC SYNDROME, UNSPECIFIED: ICD-10-CM

## 2024-03-21 ENCOUNTER — APPOINTMENT (OUTPATIENT)
Dept: HEMATOLOGY ONCOLOGY | Facility: CLINIC | Age: 54
End: 2024-03-21

## 2024-04-10 ENCOUNTER — APPOINTMENT (OUTPATIENT)
Dept: CARDIOLOGY | Facility: CLINIC | Age: 54
End: 2024-04-10
Payer: COMMERCIAL

## 2024-04-10 VITALS
DIASTOLIC BLOOD PRESSURE: 109 MMHG | SYSTOLIC BLOOD PRESSURE: 164 MMHG | HEIGHT: 71.85 IN | OXYGEN SATURATION: 99 % | BODY MASS INDEX: 28.76 KG/M2 | HEART RATE: 75 BPM | WEIGHT: 210 LBS

## 2024-04-10 DIAGNOSIS — I87.2 VENOUS INSUFFICIENCY (CHRONIC) (PERIPHERAL): ICD-10-CM

## 2024-04-10 DIAGNOSIS — E78.5 HYPERLIPIDEMIA, UNSPECIFIED: ICD-10-CM

## 2024-04-10 DIAGNOSIS — I26.99 OTHER PULMONARY EMBOLISM W/OUT ACUTE COR PULMONALE: ICD-10-CM

## 2024-04-10 DIAGNOSIS — I10 ESSENTIAL (PRIMARY) HYPERTENSION: ICD-10-CM

## 2024-04-10 DIAGNOSIS — R06.83 SNORING: ICD-10-CM

## 2024-04-10 PROCEDURE — 99215 OFFICE O/P EST HI 40 MIN: CPT

## 2024-04-12 PROBLEM — I87.2 VENOUS (PERIPHERAL) INSUFFICIENCY: Status: ACTIVE | Noted: 2024-02-07

## 2024-04-12 PROBLEM — E78.5 HYPERLIPIDEMIA: Status: ACTIVE | Noted: 2023-12-13

## 2024-04-12 PROBLEM — I26.99 OTHER ACUTE PULMONARY EMBOLISM: Status: ACTIVE | Noted: 2023-12-12

## 2024-04-12 PROBLEM — I10 BENIGN ESSENTIAL HYPERTENSION: Status: ACTIVE | Noted: 2023-12-12

## 2024-04-12 NOTE — REASON FOR VISIT
[Follow-Up - Clinic] : a clinic follow-up of [Peripheral Vascular Disease] : peripheral vascular disease [FreeTextEntry1] : 53 yo M with PMHx of venous insufficiency, recent intermediate high risk PE, LLE DVT, medically managed, on DOAC, here for vascular medicine follow-up.   Has not seen eye doctor yet. He is set-up to see neurology at the end of the month. No SOB, chest pain, palpitations. Still has leg swelling, Right ankle>Left ankle. Wears compression stockings. Still having the same vision changes. Some issues with looking down and looking to the side while driving. Only symptoms when he is sitting down. Not with standing. Also having weird symptoms while laying down and he rotates his head to the side. He does snore. His primary care doctor was encouraging him to have a sleep apnea test. No issues with blood thinner. Reviewed 24 hour BP monitor, stage 1 HTN, has night time hypertension as well. Only on a small dose of beta blocker.   Repeat BPs: L arm 145/80, R arm: 140/86 mmHg  Hospital course: intermediate high risk PE, not interested in invasive procedure. Severe pHTN on TTE. Heparin gtt to DOAC with run-in. CT scan with no masses or lymph nodes. No compression of veins. Venous duplex with acute above and below the knee left lower extremity DVT  affecting the femoral and popliteal veins, the posterior tibial peroneal  trunk and the left posterior tibial peroneal and gastrocnemius veins. There are bilaterally thrombosed, varicose great saphenous veins. Labs: Factor V and Antithrombin III antigen wnl; Prothrombin and Factor V Leiden gene mutations negative. CEA, PSA negative. Anticardiolipin negative, silica clotting time and DRVVT ratio negative. Hematology follow-up tomorrow.   Since d/c doing well. No symptoms at rest or with exertion. Going up flights of stairs without issues. No CP, SOB, palpitations, or syncope. No issues with bleeding. Has the stool test for cancer screening; discussed that he might need a colonoscopy anyways. Discussed interval imaging for RV function and LE DVTs. No desaturation with ambulation.   Swelling controlled with compression.

## 2024-04-12 NOTE — REVIEW OF SYSTEMS
[Blurry Vision] : blurred vision [SOB] : no shortness of breath [Dyspnea on exertion] : not dyspnea during exertion [Lower Ext Edema] : no extremity edema [Leg Claudication] : no intermittent leg claudication [Orthopnea] : no orthopnea [Syncope] : no syncope [Change In Color Of Skin] : change in skin color [Dizziness] : dizziness [Negative] : Heme/Lymph [de-identified] : +varicose veins

## 2024-04-12 NOTE — ASSESSMENT
[FreeTextEntry1] : #Intermediate high risk PE, unclear provoking factor. Long standing venous insufficiency and sits for long periods of time at his job. Not uptodate with his age appropriate cancer screening. Initial limited hypercoag work-up unremarkable. No compression on CT venogram. TTE with moderate pulm HTN. Will need to follow closely with interval imaging.   -continue DOAC -f/u hematology -will get full venous duplex with reflux study once other issues figured out to evaluate if a role for endovenous therapy given swelling and symptoms despite conservative therapy; continue compression therapy (gave new script for compression stockings) -reviewed signs and symptoms to seek emergency care; if emergency procedure arises that he needs to stop the blood thinner for he should notify the office -f/u at 3 month  #HTN: 24 hr ambulatory BP monitor with stage 1 HTN, and nighttime HTN.  -sleep apnea home test referral  -discussed next visit that titration of BP meds to be done after trial of exercise, salt reduction and evaluation for sleep apnea  #Vision changes: hit his head when fell, no prior conversation about concussion but seeing neurology at the end of the month which will be healthy. Also going to see ophthalmology.   Follow-up 3 months

## 2024-06-03 ENCOUNTER — OUTPATIENT (OUTPATIENT)
Dept: OUTPATIENT SERVICES | Facility: HOSPITAL | Age: 54
LOS: 1 days | Discharge: HOME | End: 2024-06-03

## 2024-06-03 DIAGNOSIS — D64.9 ANEMIA, UNSPECIFIED: ICD-10-CM

## 2024-06-06 ENCOUNTER — APPOINTMENT (OUTPATIENT)
Dept: HEMATOLOGY ONCOLOGY | Facility: CLINIC | Age: 54
End: 2024-06-06

## 2024-07-02 PROBLEM — I82.499 DEEP VEIN THROMBOSIS (DVT) OF OTHER VEIN OF LOWER EXTREMITY: Status: ACTIVE | Noted: 2023-12-13

## 2024-07-10 ENCOUNTER — APPOINTMENT (OUTPATIENT)
Dept: CARDIOLOGY | Facility: CLINIC | Age: 54
End: 2024-07-10
Payer: COMMERCIAL

## 2024-07-10 ENCOUNTER — APPOINTMENT (OUTPATIENT)
Dept: HEMATOLOGY ONCOLOGY | Facility: CLINIC | Age: 54
End: 2024-07-10

## 2024-07-10 ENCOUNTER — APPOINTMENT (OUTPATIENT)
Dept: ELECTROPHYSIOLOGY | Facility: CLINIC | Age: 54
End: 2024-07-10

## 2024-07-10 VITALS
WEIGHT: 207 LBS | BODY MASS INDEX: 28.19 KG/M2 | OXYGEN SATURATION: 98 % | DIASTOLIC BLOOD PRESSURE: 88 MMHG | HEART RATE: 99 BPM | SYSTOLIC BLOOD PRESSURE: 148 MMHG

## 2024-07-10 DIAGNOSIS — I82.499 ACUTE EMBOLISM AND THROMBOSIS OF OTHER SPECIFIED DEEP VEIN OF UNSPECIFIED LOWER EXTREMITY: ICD-10-CM

## 2024-07-10 DIAGNOSIS — I10 ESSENTIAL (PRIMARY) HYPERTENSION: ICD-10-CM

## 2024-07-10 DIAGNOSIS — I87.2 VENOUS INSUFFICIENCY (CHRONIC) (PERIPHERAL): ICD-10-CM

## 2024-07-10 DIAGNOSIS — I49.9 CARDIAC ARRHYTHMIA, UNSPECIFIED: ICD-10-CM

## 2024-07-10 DIAGNOSIS — I26.99 OTHER PULMONARY EMBOLISM W/OUT ACUTE COR PULMONALE: ICD-10-CM

## 2024-07-10 PROCEDURE — 99215 OFFICE O/P EST HI 40 MIN: CPT

## 2024-07-11 ENCOUNTER — APPOINTMENT (OUTPATIENT)
Dept: ELECTROPHYSIOLOGY | Facility: CLINIC | Age: 54
End: 2024-07-11

## 2024-07-29 DIAGNOSIS — I82.499 ACUTE EMBOLISM AND THROMBOSIS OF OTHER SPECIFIED DEEP VEIN OF UNSPECIFIED LOWER EXTREMITY: ICD-10-CM

## 2024-07-30 PROCEDURE — 93248 EXT ECG>7D<15D REV&INTERPJ: CPT

## 2024-08-06 ENCOUNTER — OUTPATIENT (OUTPATIENT)
Dept: OUTPATIENT SERVICES | Facility: HOSPITAL | Age: 54
LOS: 1 days | Discharge: HOME | End: 2024-08-06

## 2024-08-06 DIAGNOSIS — D64.9 ANEMIA, UNSPECIFIED: ICD-10-CM

## 2024-08-09 ENCOUNTER — APPOINTMENT (OUTPATIENT)
Dept: HEMATOLOGY ONCOLOGY | Facility: CLINIC | Age: 54
End: 2024-08-09

## 2024-10-01 ENCOUNTER — NON-APPOINTMENT (OUTPATIENT)
Age: 54
End: 2024-10-01

## 2024-10-02 ENCOUNTER — APPOINTMENT (OUTPATIENT)
Dept: CARDIOLOGY | Facility: CLINIC | Age: 54
End: 2024-10-02
Payer: COMMERCIAL

## 2024-10-02 VITALS — HEART RATE: 85 BPM | OXYGEN SATURATION: 99 % | SYSTOLIC BLOOD PRESSURE: 157 MMHG | DIASTOLIC BLOOD PRESSURE: 98 MMHG

## 2024-10-02 DIAGNOSIS — I82.499 ACUTE EMBOLISM AND THROMBOSIS OF OTHER SPECIFIED DEEP VEIN OF UNSPECIFIED LOWER EXTREMITY: ICD-10-CM

## 2024-10-02 DIAGNOSIS — Z79.01 LONG TERM (CURRENT) USE OF ANTICOAGULANTS: ICD-10-CM

## 2024-10-02 DIAGNOSIS — I87.2 VENOUS INSUFFICIENCY (CHRONIC) (PERIPHERAL): ICD-10-CM

## 2024-10-02 DIAGNOSIS — I10 ESSENTIAL (PRIMARY) HYPERTENSION: ICD-10-CM

## 2024-10-02 DIAGNOSIS — R42 DIZZINESS AND GIDDINESS: ICD-10-CM

## 2024-10-02 DIAGNOSIS — I26.99 OTHER PULMONARY EMBOLISM W/OUT ACUTE COR PULMONALE: ICD-10-CM

## 2024-10-02 PROCEDURE — 99215 OFFICE O/P EST HI 40 MIN: CPT

## 2024-10-02 RX ORDER — HYDROCHLOROTHIAZIDE 12.5 MG/1
12.5 CAPSULE ORAL DAILY
Qty: 30 | Refills: 3 | Status: ACTIVE | COMMUNITY
Start: 2024-10-02 | End: 1900-01-01

## 2024-10-03 NOTE — REVIEW OF SYSTEMS
CAD [Blurry Vision] : blurred vision [SOB] : no shortness of breath [Dyspnea on exertion] : not dyspnea during exertion [Lower Ext Edema] : no extremity edema [Leg Claudication] : no intermittent leg claudication [Orthopnea] : no orthopnea [Syncope] : no syncope [Change In Color Of Skin] : change in skin color [Dizziness] : dizziness [Negative] : Heme/Lymph [de-identified] : +varicose veins

## 2024-10-03 NOTE — ASSESSMENT
[FreeTextEntry1] : #Intermediate high risk PE, unclear provoking factor. Long standing venous insufficiency and sits for long periods of time at his job. Not uptodate with his age appropriate cancer screening. Initial limited hypercoag work-up unremarkable. No compression on CT venogram. TTE with moderate pulm HTN. Will need to follow closely with interval imaging.  -continue DOAC -f/u hematology -will get full venous duplex with reflux study once other issues figured out to evaluate if a role for endovenous therapy once he has changed insurance given high out of pocket cost (to be done in November) -reviewed signs and symptoms to seek emergency care; if emergency procedure arises that he needs to stop the blood thinner for he should notify the office -f/u at 6 weeks  #HTN: 24 hr ambulatory BP monitor with stage 1 HTN, and nighttime HTN. -sleep apnea home test referral -uptirated Ramipril, and stopped Indapine; started 12.5mg HCTZ, labs in 1 week   #Vision changes: hit his head when fell, no prior conversation about concussion but seeing neurology at the end of the month which will be healthy. Also going to see ophthalmology. -pending brain MRI, otherwise not remarkable work-up so far  Follow-up 6 weeks

## 2024-10-03 NOTE — REASON FOR VISIT
[Follow-Up - Clinic] : a clinic follow-up of [Peripheral Vascular Disease] : peripheral vascular disease [FreeTextEntry1] : 53 yo M with PMHx of venous insufficiency, recent intermediate high risk PE, LLE DVT, medically managed, on DOAC, here for vascular medicine follow-up.   Still with LH. BP still not at goal. Otherwise no new issues. Still waiting for some of his neuro work-up given the out of pocket cost.   Hospital course: intermediate high risk PE, not interested in invasive procedure. Severe pHTN on TTE. Heparin gtt to DOAC with run-in. CT scan with no masses or lymph nodes. No compression of veins. Venous duplex with acute above and below the knee left lower extremity DVT  affecting the femoral and popliteal veins, the posterior tibial peroneal  trunk and the left posterior tibial peroneal and gastrocnemius veins. There are bilaterally thrombosed, varicose great saphenous veins. Labs: Factor V and Antithrombin III antigen wnl; Prothrombin and Factor V Leiden gene mutations negative. CEA, PSA negative. Anticardiolipin negative, silica clotting time and DRVVT ratio negative. Hematology follow-up tomorrow.   Since d/c doing well. No symptoms at rest or with exertion. Going up flights of stairs without issues. No CP, SOB, palpitations, or syncope. No issues with bleeding. Has the stool test for cancer screening; discussed that he might need a colonoscopy anyways. Discussed interval imaging for RV function and LE DVTs. No desaturation with ambulation.   Swelling controlled with compression.

## 2024-10-03 NOTE — REASON FOR VISIT
[Follow-Up - Clinic] : a clinic follow-up of [Peripheral Vascular Disease] : peripheral vascular disease [FreeTextEntry1] : 55 yo M with PMHx of venous insufficiency, recent intermediate high risk PE, LLE DVT, medically managed, on DOAC, here for vascular medicine follow-up.   Still with LH. BP still not at goal. Otherwise no new issues. Still waiting for some of his neuro work-up given the out of pocket cost.   Hospital course: intermediate high risk PE, not interested in invasive procedure. Severe pHTN on TTE. Heparin gtt to DOAC with run-in. CT scan with no masses or lymph nodes. No compression of veins. Venous duplex with acute above and below the knee left lower extremity DVT  affecting the femoral and popliteal veins, the posterior tibial peroneal  trunk and the left posterior tibial peroneal and gastrocnemius veins. There are bilaterally thrombosed, varicose great saphenous veins. Labs: Factor V and Antithrombin III antigen wnl; Prothrombin and Factor V Leiden gene mutations negative. CEA, PSA negative. Anticardiolipin negative, silica clotting time and DRVVT ratio negative. Hematology follow-up tomorrow.   Since d/c doing well. No symptoms at rest or with exertion. Going up flights of stairs without issues. No CP, SOB, palpitations, or syncope. No issues with bleeding. Has the stool test for cancer screening; discussed that he might need a colonoscopy anyways. Discussed interval imaging for RV function and LE DVTs. No desaturation with ambulation.   Swelling controlled with compression.

## 2024-10-03 NOTE — REVIEW OF SYSTEMS
[Blurry Vision] : blurred vision [SOB] : no shortness of breath [Dyspnea on exertion] : not dyspnea during exertion [Lower Ext Edema] : no extremity edema [Leg Claudication] : no intermittent leg claudication [Orthopnea] : no orthopnea [Syncope] : no syncope [Change In Color Of Skin] : change in skin color [Dizziness] : dizziness [Negative] : Heme/Lymph [de-identified] : +varicose veins

## 2024-10-11 LAB
ANION GAP SERPL CALC-SCNC: 12 MMOL/L
BUN SERPL-MCNC: 10 MG/DL
CALCIUM SERPL-MCNC: 10.2 MG/DL
CHLORIDE SERPL-SCNC: 102 MMOL/L
CO2 SERPL-SCNC: 25 MMOL/L
CREAT SERPL-MCNC: 1.05 MG/DL
EGFR: 84 ML/MIN/1.73M2
GLUCOSE SERPL-MCNC: 116 MG/DL
POTASSIUM SERPL-SCNC: 4.4 MMOL/L
SODIUM SERPL-SCNC: 139 MMOL/L

## 2025-01-27 ENCOUNTER — RX RENEWAL (OUTPATIENT)
Age: 55
End: 2025-01-27

## 2025-03-24 NOTE — PROGRESS NOTE ADULT - SUBJECTIVE AND OBJECTIVE BOX
DATE OF SERVICE: 11-10-23 @ 16:01    Patient is a 53y old  Male who presents with a chief complaint of Pulmonary embolism w/ R heart strain (10 Nov 2023 15:39)      INTERVAL HISTORY: Feels ok.     REVIEW OF SYSTEMS:  CONSTITUTIONAL: No weakness  EYES/ENT: No visual changes;  No throat pain   NECK: No pain or stiffness  RESPIRATORY: No cough, wheezing; No shortness of breath  CARDIOVASCULAR: No chest pain or palpitations  GASTROINTESTINAL: No abdominal  pain. No nausea, vomiting, or hematemesis  GENITOURINARY: No dysuria, frequency or hematuria  NEUROLOGICAL: No stroke like symptoms  SKIN: No rashes    TELEMETRY Personally reviewed: SR/ST 90-120s  	  MEDICATIONS:  metoprolol tartrate 12.5 milliGRAM(s) Oral two times a day        PHYSICAL EXAM:  T(C): 37.3 (11-10-23 @ 11:12), Max: 37.4 (11-09-23 @ 20:30)  HR: 112 (11-10-23 @ 11:12) (96 - 118)  BP: 146/99 (11-10-23 @ 11:12) (130/93 - 159/97)  RR: 18 (11-10-23 @ 11:12) (18 - 18)  SpO2: 99% (11-10-23 @ 11:12) (97% - 100%)  Wt(kg): --  I&O's Summary    09 Nov 2023 07:01  -  10 Nov 2023 07:00  --------------------------------------------------------  IN: 0 mL / OUT: 500 mL / NET: -500 mL    10 Nov 2023 07:01  -  10 Nov 2023 16:01  --------------------------------------------------------  IN: 480 mL / OUT: 0 mL / NET: 480 mL          Appearance: In no distress	  HEENT:    PERRL, EOMI	  Cardiovascular:  S1 S2, No JVD  Respiratory: Lungs clear to auscultation	  Gastrointestinal:  Soft, Non-tender, + BS	  Vascularature:  No edema of LE  Psychiatric: Appropriate affect   Neuro: no acute focal deficits                               11.6   6.35  )-----------( 102      ( 10 Nov 2023 06:16 )             34.6     11-10    136  |  101  |  10  ----------------------------<  109<H>  3.8   |  21<L>  |  0.99    Ca    9.9      10 Nov 2023 06:12  Phos  3.2     11-09  Mg     1.8     11-09          Labs personally reviewed      ASSESSMENT/PLAN: 	    53M PMHx HTN, HLD, Chronic Venous Insufficiency, presents for syncopal episode iso bilateral PE. Transferred from [facility] after found to have large bilat PE in Pulm arteries extending to lobar branches seen on CTA with associated right heart strain.       Problem/Plan - 1:  ·  Problem: Pulmonary embolism.   ·  Plan: Large bilat PE in pulm art extending into lobar branches;   -  Doppler- acute above and below the knee left lower extremity DVT affecting the femoral and popliteal veins, the posterior tibial peroneal trunk and the left posterior tibial peroneal and gastrocnemius veins. Bilaterally thrombosed, varicose great saphenous veins.  - not hypoxic or hypotensive, comfortable on RA  - TTE w R heart strain and severe pulm HTN,  - Pt declines procedures, vascular interventional cards recs appreciated  - Mild compensatory physiologic sinus tach  - Transition to Eliquis 10mg BID    Problem/Plan - 2:  ·  Problem: Syncope.   ·  Plan: Likely due to PE.   - no further work up     Problem/Plan - 3:  ·  Problem: HTN (hypertension).   ·  Plan: Home Ramipril and thiazide (Inapamide) held on admission due to right heart strain. SBP 130s.     Problem/Plan - 4:  ·  Problem: HLD (hyperlipidemia).   ·  Plan: Home atorvastatin.    Problem/Plan - 5:  ·  Problem: Abnormal EKG  ·  Plan: EP recs appreciated ST elevations are likely secondary to Right heart strain and unlikely Brugada   - Genetic consult with Dr. Chago Rudolph as outpatient              Cristiana Zelaya, AG-NP   Didier Alvarez,  [facility]  Cardiovascular Medicine  [address], [address]  Available through call or text on [employer] TE[employer]  Office: [phone]   DATE OF SERVICE: 11-10-23 @ 16:01    Patient is a 53y old  Male who presents with a chief complaint of Pulmonary embolism w/ R heart strain (10 Nov 2023 15:39)      INTERVAL HISTORY: Feels ok.     REVIEW OF SYSTEMS:  CONSTITUTIONAL: No weakness  EYES/ENT: No visual changes;  No throat pain   NECK: No pain or stiffness  RESPIRATORY: No cough, wheezing; No shortness of breath  CARDIOVASCULAR: No chest pain or palpitations  GASTROINTESTINAL: No abdominal  pain. No nausea, vomiting, or hematemesis  GENITOURINARY: No dysuria, frequency or hematuria  NEUROLOGICAL: No stroke like symptoms  SKIN: No rashes    TELEMETRY Personally reviewed: SR/ST 90-120s  	  MEDICATIONS:  metoprolol tartrate 12.5 milliGRAM(s) Oral two times a day        PHYSICAL EXAM:  T(C): 37.3 (11-10-23 @ 11:12), Max: 37.4 (11-09-23 @ 20:30)  HR: 112 (11-10-23 @ 11:12) (96 - 118)  BP: 146/99 (11-10-23 @ 11:12) (130/93 - 159/97)  RR: 18 (11-10-23 @ 11:12) (18 - 18)  SpO2: 99% (11-10-23 @ 11:12) (97% - 100%)  Wt(kg): --  I&O's Summary    09 Nov 2023 07:01  -  10 Nov 2023 07:00  --------------------------------------------------------  IN: 0 mL / OUT: 500 mL / NET: -500 mL    10 Nov 2023 07:01  -  10 Nov 2023 16:01  --------------------------------------------------------  IN: 480 mL / OUT: 0 mL / NET: 480 mL          Appearance: In no distress	  HEENT:    PERRL, EOMI	  Cardiovascular:  S1 S2, No JVD  Respiratory: Lungs clear to auscultation	  Gastrointestinal:  Soft, Non-tender, + BS	  Vascularature:  No edema of LE  Psychiatric: Appropriate affect   Neuro: no acute focal deficits                               11.6   6.35  )-----------( 102      ( 10 Nov 2023 06:16 )             34.6     11-10    136  |  101  |  10  ----------------------------<  109<H>  3.8   |  21<L>  |  0.99    Ca    9.9      10 Nov 2023 06:12  Phos  3.2     11-09  Mg     1.8     11-09          Labs personally reviewed      ASSESSMENT/PLAN: 	    53M PMHx HTN, HLD, Chronic Venous Insufficiency, presents for syncopal episode iso bilateral PE. Transferred from [facility] after found to have large bilat PE in Pulm arteries extending to lobar branches seen on CTA with associated right heart strain.       Problem/Plan - 1:  ·  Problem: Pulmonary embolism.   ·  Plan: Large bilat PE in pulm art extending into lobar branches;   -  Doppler- acute above and below the knee left lower extremity DVT affecting the femoral and popliteal veins, the posterior tibial peroneal trunk and the left posterior tibial peroneal and gastrocnemius veins. Bilaterally thrombosed, varicose great saphenous veins.  - not hypoxic or hypotensive, comfortable on RA  - TTE w R heart strain and severe pulm HTN,  - Pt declines procedures, vascular interventional cards recs appreciated  - Mild compensatory physiologic sinus tach  - Transition to Eliquis 10mg BID  - Hypercoag work up by heme ongoing  - 11/10 pt ambulated around entire unit comfortably     Problem/Plan - 2:  ·  Problem: Syncope.   ·  Plan: Likely due to PE.   - no further work up     Problem/Plan - 3:  ·  Problem: HTN (hypertension).   ·  Plan: Home Ramipril and thiazide (Inapamide) held on admission due to right heart strain. SBP 130s.     Problem/Plan - 4:  ·  Problem: HLD (hyperlipidemia).   ·  Plan: Home atorvastatin.    Problem/Plan - 5:  ·  Problem: Abnormal EKG  ·  Plan: EP recs appreciated ST elevations are likely secondary to Right heart strain and unlikely Brugada   - Genetic consult with Dr. Chago Rudolph as outpatient              Cristiana Zelaya, AG-JENNIFER Alvarez DO [facility]  Cardiovascular Medicine  [address], [address]  Available through call or text on [employer] TEAM[employer]  Office: [phone]   show

## 2025-05-30 ENCOUNTER — RX RENEWAL (OUTPATIENT)
Age: 55
End: 2025-05-30